# Patient Record
Sex: MALE | Race: BLACK OR AFRICAN AMERICAN | NOT HISPANIC OR LATINO | Employment: UNEMPLOYED
[De-identification: names, ages, dates, MRNs, and addresses within clinical notes are randomized per-mention and may not be internally consistent; named-entity substitution may affect disease eponyms.]

---
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
## 2021-02-12 ENCOUNTER — TRANSFERRED RECORDS (OUTPATIENT)
Dept: HEALTH INFORMATION MANAGEMENT | Facility: CLINIC | Age: 21
End: 2021-02-12

## 2021-02-15 ENCOUNTER — TRANSCRIBE ORDERS (OUTPATIENT)
Dept: OTHER | Age: 21
End: 2021-02-15

## 2021-02-15 DIAGNOSIS — M17.0 ARTHRITIS OF BOTH KNEES: Primary | ICD-10-CM

## 2021-09-01 ENCOUNTER — TRANSFERRED RECORDS (OUTPATIENT)
Dept: HEALTH INFORMATION MANAGEMENT | Facility: CLINIC | Age: 21
End: 2021-09-01

## 2021-09-01 ENCOUNTER — TELEPHONE (OUTPATIENT)
Dept: HEMATOLOGY | Facility: CLINIC | Age: 21
End: 2021-09-01

## 2021-09-01 NOTE — TELEPHONE ENCOUNTER
RN received a phone call from Roxie VILLAR at [facility] regarding scheduling a transition visit for Sharron.He was on the phone as well. Sharron is a 20 year old male with Severe Hemophilia A who is currently maintained on Hemlibra therapy- he is logging in [employer].     RN reviewed with Sivakumar Stephen PA-C and Sharron is now scheduled with Sivakumar on 11/9 at 9:00am for a 1 hour new. This will be scheduled and the team will be alerted.     Sheela Gonzalez, MSN, RN, PHN - Nurse Clinician - [facility] - [phone]

## 2021-11-09 ENCOUNTER — OFFICE VISIT (OUTPATIENT)
Dept: HEMATOLOGY | Facility: CLINIC | Age: 21
End: 2021-11-09
Attending: PHYSICIAN ASSISTANT
Payer: COMMERCIAL

## 2021-11-09 VITALS
BODY MASS INDEX: 27.16 KG/M2 | HEART RATE: 113 BPM | HEIGHT: 77 IN | DIASTOLIC BLOOD PRESSURE: 90 MMHG | WEIGHT: 230 LBS | RESPIRATION RATE: 14 BRPM | OXYGEN SATURATION: 98 % | SYSTOLIC BLOOD PRESSURE: 139 MMHG | TEMPERATURE: 98.1 F

## 2021-11-09 DIAGNOSIS — D66 HEMOPHILIC ARTHROPATHY (H): ICD-10-CM

## 2021-11-09 DIAGNOSIS — D66 HEMOPHILIA (H): ICD-10-CM

## 2021-11-09 DIAGNOSIS — M36.2 HEMOPHILIC ARTHROPATHY (H): ICD-10-CM

## 2021-11-09 DIAGNOSIS — D66: Primary | ICD-10-CM

## 2021-11-09 PROCEDURE — G0463 HOSPITAL OUTPT CLINIC VISIT: HCPCS

## 2021-11-09 PROCEDURE — 99205 OFFICE O/P NEW HI 60 MIN: CPT | Performed by: PHYSICIAN ASSISTANT

## 2021-11-09 RX ORDER — CHOLECALCIFEROL (VITAMIN D3) 50 MCG
1 TABLET ORAL DAILY
Qty: 90 TABLET | Refills: 3 | Status: SHIPPED | OUTPATIENT
Start: 2021-11-09

## 2021-11-09 RX ORDER — ANTIHEMOPHILIC FACTOR (RECOMBINANT) 4000 (+/-)
4000 KIT INTRAVENOUS ONCE
Qty: 4000 EACH | Refills: 0 | Status: SHIPPED | OUTPATIENT
Start: 2021-11-09 | End: 2021-11-09

## 2021-11-09 RX ORDER — SWAB
1 SWAB, NON-MEDICATED MISCELLANEOUS DAILY
Qty: 90 CAPSULE | Refills: 3 | Status: SHIPPED | OUTPATIENT
Start: 2021-11-09 | End: 2021-11-09

## 2021-11-09 RX ORDER — EMICIZUMAB 150 MG/ML
INJECTION, SOLUTION SUBCUTANEOUS
Qty: 4 ML | Refills: 5 | Status: SHIPPED | OUTPATIENT
Start: 2021-11-09 | End: 2022-03-08

## 2021-11-09 ASSESSMENT — MIFFLIN-ST. JEOR: SCORE: 2165.65

## 2021-11-09 ASSESSMENT — PAIN SCALES - GENERAL: PAINLEVEL: NO PAIN (0)

## 2021-11-09 NOTE — PROGRESS NOTES
[facility]  [address] [place] [id number]  Main: [phone], Fax: [phone]    Patient seen at: [facility] at [address] [place] [address]    Outpatient Visit Note:    Patient: Sharron Kennedy  MRN: [id number]  : 2000  VAMSHI: 2021    Reason:  Severe hemophilia A. Establish care for the first time at this adult treatment center.     HPI:  Sharron is a 21 year old male with a history of severe hemophilia A with his baseline factor VIII level at <1% who has no history of factor VIII inhibitor, presents to clinic today to establish care for the first time at this adult hemophilia treatment center ([facility]). He has been followed by [facility] prior to today's visit. His last hemophilia comprehensive clinic visit at [facility] was on 2021.     Sharron was born in [facility]. He was diagnosed with hemophilia at around 1 year of age. While in [place], he recalls that with any bleeding episodes, he had received FFP transfusion and ultimately he also might have received factor VIII concentrates. His family then moved to the [place] and resided in [place] back in 2010 when he was around 9 years of age. While he was residing in [place], he was placed on on-demand episodic factor VIII replacement therapy.     Then in , he moved to [facility] and was started on prophylaxis factor VIII replacement therapy with Advate at 15-20 Units/kg every Monday, Wednesday and .     In 2/15/2012, he had a port placed and then removed later around  when this port became infected. While in [place], he apparently had documented bleeding to the right knee on 2012. He also had documented right elbow hemarthrosis in 2012, 2012, 10/21/2012, 1/15/2013, 2013; 2013; 2014; and 2014.     Then in Summer of 2015, he  relocated from [facility] to [place].     11/10/2016, he underwent circumcision surgery with factor VIII replacement and did not experience any bleeding complications.     Oct 2018, his prophylactic factor VIII infusions was increased to every other day regimen due to increasing / persistent pain despite physical therapy.     The decision was made on 9/27/2019 to start him on emicizumab.     Since he has been on emicizumab, he reports no breakthrough bleeding episodes. He is very content and pleased with his experience with emicizumab. Currently he is on 600 mg SubQ Q 28 days injections (which is approximately 6 mg/kg Q 28 days).     As mentioned, his last comprehensive clinic visit at [facility] was back in Sept 2021 and at that visit, it was reported that he had no hospital or emergency department visit in the past year. He also reported zero numbers of joint bleeds in the past year.     It is reportedly that historically his bilateral elbows, knees and ankles have evidence of chronic arthropathy. 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 More recently in the past few months, he also has been wearing a knee brace on his right knee as he also felt that he also has patella subluxation on his right.     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     ROS:  Today, he has no particular complaints. He denies any bleeding issues. Denies any frequent epistaxis. No oral mucosal bleeding. Denies any hematuria or blood in stools.     Medication:  Current Outpatient Medications   Medication     ADVATE 4000 units SOLR injection     HEMLIBRA 105 MG/0.7ML injection     vitamin D3 (CHOLECALCIFEROL) 50 mcg (2000 units) tablet     No current facility-administered medications for this visit.     Allergies:   No Known Allergies    PmHx:  As above.     Social  "History:  He is currently attending college at [employer] studying a degree in computer aided design. He is anticipating to graduate in 2025. He does enjoy playing video games. 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 He graduated from [employer] in 2019. He lives with his parents and siblings currently. He does not eat pork.     Family History:  He has 3 sisters and 2 brothers. His two brothers (9 years of age and 12 years of age), both have hemophilia. He is unclear if his sisters are carrier of hemophilia.     Objective:  Pleasant in no acute distress.  Vitals: BP (!) 139/90 (BP Location: Right arm, Patient Position: Sitting, Cuff Size: Adult Regular)   Pulse 113   Temp 98.1  F (36.7  C) (Oral)   Resp 14   Ht 1.956 m (6' 5\")   Wt 104.3 kg (230 lb)   SpO2 98%   BMI 27.27 kg/m    Exam:    He uses knee braces in both knees.    He has limited extension of both elbows with right worse than left.     Ankle joints has full active ROM without deformities on today's gross examination.     Labs:  [facility] data done in 2016 according to records from [facility]:  Baseline factor VIII <1%  Ristocetin 82%  VW Agn 111%    Assessment:  In summary, Sharron is a 21 year old male with a history of severe hemophilia A with his baseline factor VIII level at <1% with no history of factor VIII inhibitor, presents to clinic today to transition his hemophilia care from pediatric [facility] to this adult [facility]. His last hemophilia comprehensive clinic visit was back in Sept 2021.     Sharron has been doing very well since he has transitioned to emicizumab back in Sept 2019. He has had no report of bleeding episodes since that time. In fact a lot of his pain related to his known arthropathy had significantly improved to basically resolved since he has been on emicizumab.     Diagnosis:  1. Severe hemophilia A.  2. Hemophilic arthropathy of multiple joints. "     Plan:  No change in regard to the management of his hemophilia. He will continue with emicizumab at 600 mg SubQ Q 28 days injection for prevention of acute bleeding episodes. I took some time today to discuss with him about the management of acute traumatic injuries or breakthrough bleeding while he is on emicizumab. I explain to him that despite the fact that he is on emicizumab, he could potentially have breakthrough bleeding with traumatic injuries and in those cases, we would recommend that he treat with factor VIII replacement. Thus, I have elected today to prescribe a single dose of Advate at 40 Units/kg dose for emergency use.     I have also provided him with a prescription of vitamin D3 today as he actually had run out of this prescription.     I have instructed Sharron that from today forward, he is to contact our clinic if he should experience any acute bleeding episodes or breakthrough bleeding or if he should require any surgical or invasive procedures. He is provided with our clinic contact information.     I have instructed him to return in Feb 2022 or March 2022 for a full hemophilia comprehensive clinic visit and to meet the rest of our team at that point. He is in agreement with this.     69 minutes spent on the date of the encounter doing chart review, history and exam, documentation and further activities per the note.    Time IN: 09:45  Time OUT: 10:38         Sivakumar Stephen PA-C, MPAS  Physician Assistant  [facility].

## 2021-11-09 NOTE — PATIENT INSTRUCTIONS
Thank you for coming into clinic today. We would like to see you back on March 8th at 10:30 am for a comprehensive care visit. We will call you the week before to remind you.     Please call us with any questions, comments or concerns    Nurse line [phone]   Sheela line [phone]    Sheela Gonzalez  MSN, RN, PHN  [facility] for [facility]  Office: [phone]  Fax: [phone]

## 2021-11-10 ENCOUNTER — TELEPHONE (OUTPATIENT)
Dept: HEMATOLOGY | Facility: CLINIC | Age: 21
End: 2021-11-10
Payer: COMMERCIAL

## 2021-11-15 ENCOUNTER — DOCUMENTATION ONLY (OUTPATIENT)
Dept: PHYSICAL THERAPY | Facility: CLINIC | Age: 21
End: 2021-11-15
Payer: COMMERCIAL

## 2021-11-15 NOTE — PROGRESS NOTES
This patient is new to our hemophilia clinic, he was seen by provider for initial visit on 11/9/2021.    Back in Sept 2021 this writer was contacted by [facility] for assistance with obtaining new knee bracing for this patient, they wanted to go through our system since he would be transitioning to adult care.  Orders were sent to [facility] in [place] ([phone], fax: [phone]) to evaluate and provide appropriate bracing for patellar subluxation.    Confirmed that patient received bilateral knee braces on 9/14/2021.    Ekta Ku [facility]  Physical Therapist  [facility]  [phone]

## 2022-02-08 ENCOUNTER — APPOINTMENT (OUTPATIENT)
Dept: INTERPRETER SERVICES | Facility: CLINIC | Age: 22
End: 2022-02-08
Payer: COMMERCIAL

## 2022-03-01 ENCOUNTER — CARE COORDINATION (OUTPATIENT)
Dept: HEMATOLOGY | Facility: CLINIC | Age: 22
End: 2022-03-01
Payer: COMMERCIAL

## 2022-03-01 DIAGNOSIS — D66 HEMOPHILIA (H): Primary | ICD-10-CM

## 2022-03-01 NOTE — PROGRESS NOTES
[facility] - COMPREHENSIVE CLINIC  PRE-VISIT CARE COORDINATION NOTE     NAME: Sharron Kennedy  :   2000  21 year old    Comp Clinic appointment date/time:  2022 at 10am  Confirmation of appointment:  LICSW called patient via language line and a voicemail was left with appointment date and time, and request for a call back to confirm.  Follow-up text was also sent a few days later without a response.    Goals for visit:     Last CC or Office Visit:  Office Visit with PA on 21, transitioned care from [facility]"" care.    Diagnosis:  Severe Hemophilia A  Factor level:  Factor VIII <1%  Inhibitor Hx:  No history of inhibitor  Viral issues:  Vaccinated in  for HAV, HBV, HIV and HCV status unknown  Other health issues:   none  Planned procedures or surgeries: unknown  Hemophilia treatment plan:  Hemlibra (Emicizumab) 600 mg every 28 days (started 2019)  Method of logging infusions:    unknown     Insurance:  [employer]   Pharmacy:  [facility]  Pharmacy Notes:  Not signed up for [facility], pharmacy needs to see him to help sign him up.  MPR:  We started filling in Nov, fill dates are 2021,2021,22,2/15/22  Pharmacokinetics: NA on hemlibra    Joint issues:  Reported to have hemarthropathy in all 6 index joints per [facility].  Has bilat knee patellar subluxation braces.    Orthopedic procedures:  No known procedures.    PT orders:  Orders placed 22 LB    Eligible studies:  Initial registry (labs required) and ATHNdataset

## 2022-03-07 ENCOUNTER — APPOINTMENT (OUTPATIENT)
Dept: CT IMAGING | Facility: CLINIC | Age: 22
End: 2022-03-07
Attending: EMERGENCY MEDICINE
Payer: COMMERCIAL

## 2022-03-07 ENCOUNTER — HOSPITAL ENCOUNTER (EMERGENCY)
Facility: CLINIC | Age: 22
Discharge: HOME | End: 2022-03-07
Attending: EMERGENCY MEDICINE | Admitting: EMERGENCY MEDICINE
Payer: COMMERCIAL

## 2022-03-07 VITALS
DIASTOLIC BLOOD PRESSURE: 77 MMHG | WEIGHT: 221.8 LBS | OXYGEN SATURATION: 96 % | TEMPERATURE: 98.4 F | HEIGHT: 77 IN | BODY MASS INDEX: 26.19 KG/M2 | RESPIRATION RATE: 16 BRPM | HEART RATE: 76 BPM | SYSTOLIC BLOOD PRESSURE: 121 MMHG

## 2022-03-07 DIAGNOSIS — G51.0 BELL'S PALSY: ICD-10-CM

## 2022-03-07 PROCEDURE — 70450 CT HEAD/BRAIN W/O DYE: CPT

## 2022-03-07 PROCEDURE — 99284 EMERGENCY DEPT VISIT MOD MDM: CPT | Mod: 25 | Performed by: EMERGENCY MEDICINE

## 2022-03-07 PROCEDURE — 99284 EMERGENCY DEPT VISIT MOD MDM: CPT | Performed by: EMERGENCY MEDICINE

## 2022-03-07 RX ORDER — PREDNISONE 20 MG/1
TABLET ORAL
Qty: 21 TABLET | Refills: 0 | Status: SHIPPED | OUTPATIENT
Start: 2022-03-07 | End: 2023-07-18

## 2022-03-07 RX ORDER — VALACYCLOVIR HYDROCHLORIDE 1 G/1
1000 TABLET, FILM COATED ORAL 3 TIMES DAILY
Qty: 21 TABLET | Refills: 0 | Status: SHIPPED | OUTPATIENT
Start: 2022-03-07 | End: 2023-07-18

## 2022-03-07 RX ORDER — MINERAL OIL, PETROLATUM 425; 568 MG/G; MG/G
1 OINTMENT OPHTHALMIC AT BEDTIME
Qty: 7 G | Refills: 0 | Status: SHIPPED | OUTPATIENT
Start: 2022-03-07

## 2022-03-07 ASSESSMENT — ENCOUNTER SYMPTOMS
FACIAL ASYMMETRY: 1
NUMBNESS: 1

## 2022-03-07 NOTE — ED TRIAGE NOTES
Pt states he has hemophila and use totake Factor 8.  They told him he can not take Fiaba medication.  Not able to place into allergies.

## 2022-03-07 NOTE — PROGRESS NOTES
[facility]  [facility]  [facility] [address], [place] [address]  Main: [phone], Fax: [phone]      COMPREHENSIVE HEMOPHILIA CLINIC VISIT      Patient: Sharron Kennedy  MRN: [id number]  : 2000  VAMSHI: 2022    Pertinent Hemophilia History:  Diagnosis: Severe hemophilia A, baseline factor VIII level <1%  Inhibitor: No history of inhibitor  First bleed, first factor exposure: Diagnosed at age 1, living in [place] at that time. Received FFP transfusion and possibly factor VIII concentrates as a child.   Past products: He moved to [place] in 2010 and was placed on on-demand factor VIII replacement therapy. In , he was placed on prophylaxis with Advate 15-20U/kg every M, W and F while living in [place]. He moved to [place] in  and established care at [facility]. His Advate dose frequency was changed in  to every other day due to worsening pain. Transitioned to Hemlibra in 2019.    Current Treatment Regimen:     Prophylaxis (Y/N): Yes   Type of prophylaxis  (Continuous or intermittent based on bleeding events): Continuous   Factor, dose, schedule: Hemlibra 600mg subcutaneous every 28 days.      On Demand for bleeds:    Factor, dose: Advate 4000U PRN for acute bleeding.       Other factor products used historically: FFP, possibly other F8 concentrate in childhood.     Home therapy? (Y/N): Yes       Central Venous Access Device (port etc , Y/N): Not currently.   He had a L chest port-o-cath placed 4841-1724, removed due to infection.     Previous significant bleeding events: ICH, circumcision, joint/muscle disease, procedures, and other events:   History of bilateral elbow, knee and ankle arthropathy.   Right knee bleed 2012   Port 9920-1242 d/c'd after infection.  Right elbow 2012 x 3, 2013 x 3, 2014 x 2   Circumcision w/ factor replacement in 2016, uncomplicated  Treated with Advate M/W/F however had  breakthrough bleeds and transitioned to every other day in 2018.  Transitioned from Advate to Hemlibra on 9/27/2019.  No history of ICH   No history of joint procedures     Infectious complications   Hepatitis A vaccines: Immune, 2010   HIV Status: Unknown    HCV Status and treatment: Unknown   HBV Status/vaccinations: Immune 2010     Interval History:  Today, Shayne reports that he is doing well. He has been very pleased with his transition to Hemlibra. He has not had any joint bleeds in the last year. He thinks it has been three years since his last bleed. He does have a dose of Advate at home that is unexpired. He reports that he is still has proficient infusion skills should he have an acute bleed. He has brothers that self infuse at home.    He does have chronic hemophilic arthropathy of bilateral elbows, knees and ankles. He has never had any joint related procedures previously per his report. He notes that since being on Hemlibra his bilateral elbow range of motion has improved although he still has some contracture. He denies any ongoing joint pain thus does not take any pain medications. 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     Lea is in school at [employer] and is doing his classes online currently. He lives between [facility] with his family. He has three brothers and three sisters. Two of his brothers also have Hemophilia A (on prophy). 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 He is mostly sedentary at the moment.     He does not have a primary care provider that he is established with at the moment. His last dental cleaning was over a year ago. He does not have any other chronic medical conditions. His only other medication is vitamin D supplement.     The patient reports he woke up two days ago and had facial hemiparesis. He was seen in the ER yesterday. CT head was  negative for any acute pathology. He was diagnosed with Bell's Palsy and given prescription for valcyclovir and prednisone. He notes some improvement in pain but is still having difficulty closing his eye completely and issues with spilling when trying to drink liquids. He denies any recent infection. No history of HSV/HIV. He reports some recent stressors. Sleep is adequate. He has never had Bell's Palsy previously. His cousin did have it 2 years ago related to medication side effect so was familiar with it.       1. Total # bleeding events since last comprehensive hemophilia clinic visit requiring factor infusion: 0    2. Method of recalling bleeding events (memory, paper log, yesenia etc): [employer]    3.  Number of Hemarthroses in the past year:  Location Number of bleeds Other information related to each bleed   Ankle, Left 0    Ankle, Right 0    Elbow, Left 0    Elbow, Right 0    Hip, Left 0    Hip, Right 0    Knee, Left 0    Knee, Right 0    Shoulder, Left 0    Shoulder, Right 0    Other joints:         4. Other Bleeds (ICH, GI,, Soft tissue etc.) and spontaneous vs traumatic: None    5. Joint Disease and Pain:    Overall activity level:   Activity level for: Unrestricted / Limited / Unable   Work/School Unrestricted   Recreation Limited- fairly sedentary   Self-Care Unrestricted     Chronic pain? (No or every day / most days / some days): No  Prescribed opioids for chronic pain and taking how often: (Y/N), (every day / most days / some days) No    6.  Hospitalizations/Surgeries   # ER visits since last comprehensive visit: 1 - 2/2 [facility]   Hospitalizations since last comprehensive visits and number of days admitted (not surgery): None  Surgeries since last comprehensive visits and number of days admitted: None     7.  Routine health maintenance:  - Does not have a primary care provider   - Last dental cleaning >1 year ago     8. Study participation:  Registry today     Medications:  Current Outpatient  "Medications   Medication    HEMLIBRA 105 MG/0.7ML injection    vitamin D3 (CHOLECALCIFEROL) 50 mcg (2000 units) tablet     No current facility-administered medications for this visit.       Allergies:  No Known Allergies    PmHx:  Hemarthropathy of all 6 index joints     Family History:  Number of family members with same diagnosis: 2 brothers with hemophilia A, on prophy factor 8    Social History:  Please see Rosa M NAJERA's note for her evaluation and assessment.   Highest level of education: Currently a student at [employer] studying [employer]. Planning to do business minor.  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      ROS:  13 point ROS completed and negative, except as noted above.   + facial hemiparesis R sided     Objective:  Vitals:  /79 (BP Location: Right arm, Patient Position: Sitting, Cuff Size: Adult Regular)   Pulse 82   Temp 97.8  F (36.6  C) (Oral)   Resp 16   Ht 1.956 m (6' 5\")   Wt 100.2 kg (221 lb)   SpO2 98%   BMI 26.21 kg/m     Exam:  General: No acute distress  Constitutional: Appears well, no distress  HEENT: Pupils equal and round. No scleral icterus or hemorrhage. Mucous membranes moist with no wet purpura. Dentition overall ok with no signs of decay. No LAD  CV: regular rate and rhythm, no murmurs  Respiratory: clear  GI: abdomen soft, nontender, without guarding or rebound. No hepatomegaly. No splenomegaly.   Mus/Skele: no edema. Contracture of bilateral elbows Please see Petra DAI's note for joint/gait evaluation.   Skin: no visible petechiae, no ecchymosis. Keloid scar L chest at prior port site   Neuro: CN II-XII intact.. AOx3    Labs:  No labs available for review.    Imaging:  CT head 3/7/22   FINDINGS: The ventricles and basal cisterns are within normal limits  in configuration. There is no midline shift. There are no " extra-axial  fluid collections.  Gray-white differentiation is well maintained.     No intracranial hemorrhage, mass or recent infarct.     The visualized paranasal sinuses are well-aerated. There is no  mastoiditis. There are no fractures of the visualized bones.                                                                      IMPRESSION:  Normal head CT.    2019 US L KNEE         10/2015 L ELBOW XR    10/2015 L Knee XR           Study Participation:  Registry    Assessment:  In summary, Sharron Kennedy is a 21 year old year old man with severe hemophilia A with a baseline factor VIII level of <1% without a history of inhibitor, on Emicizumab, who presents for transitional comprehensive hemophilia clinic visit. He has done very well since transitioning from Advate to Emicizumab in 2019 without any breakthrough bleeding events. He has a dose of Advate available at home for acute bleed if needed. He has chronic hemarthropic joint disease of bilateral ankles, knees, and elbows. These are stable. He has not needed any joint interventions.     Plan:    Factor replacement plan:  Emicizumab 600mg subcutaneously every 28 days.   Dose recalculated and unchanged today.   Advate 4000U +/- 10% IV as needed for acute bleeding episodes.   He has one dose at home that is not yet .     Hemophilic arthropathy management plan:  Home exercise plan   Continue to wear knee braces   If worsening joint pain, consider orthopedic referral   APAP PRN     Other medical follow up plan:  Establish care with primary care provider   Overdue for dental cleaning     Labs needed:  Registry labs obtained today     Follow up clinic visit timing:  3 month follow up clinic visit with myself and MALAIKA Willson (patient prefers ).    Petra DAI; Rosa M Jaffe [facility]; and Sheela Gonzalez, nurse clinician have all seen the patient independently, please refer to their notes for their evaluation and assessment.  Dr. Pino Alvarez,  staff hematologist has also seen this patient today in clinic.       Aminata Jacobo, MPH, PAFRIEDA  [facility] for Bleeding and Clotting Disorders      HEMATOLOGY STAFF    Patient seen and evaluated as part of a shared visit.  I have reviewed the clinical history, physical exam, relevant labs, and treatment plan with the [facility], as well as other members of the comprehensive hemophilia care team.      Key findings:    21-year-old male with severe hemophilia A, no history of inhibitor.  This is his first visit to the adult [facility].  Has been on emicizumab since September 2019.  Currently on monthly maintenance dosing.  No breakthrough bleeds in the last year.  He has hemophilic arthropathy in the bilateral ankles, knees, and elbows as he did not receive prophylaxis during childhood.  Joint status currently stable.    Key management decisions:    No change in treatment plan at this time.  Return visit in 3 months for routine follow-up and to make sure he is solidly established here, as today was his first visit with us.      Total time 60 minutes, including review of medical records and labs, clinic visit, and documentation.      Pino Alvarez MD  Professor of Medicine  Division of Hematology, Oncology, and Transplantation  Director, [facility]    ___  Addendum 9/9/23  Patient has 2 maternal half brothers with hemophilia, one of whom has a history of factor VIII inhibitor.    Vidhya Licona PA-C, [facility]  [facility]  [address] [place] [address], [address], [place] [address]  Main: [phone], Fax: [phone]

## 2022-03-07 NOTE — ED PROVIDER NOTES
[facility] EMERGENCY DEPARTMENT ([facility])       3/07/22  History     Chief Complaint   Patient presents with     Neurologic Problem     Pt has right sided face numbness  that started yesterday.  Staes he is not having other symptoms related to stroke.     The history is provided by the patient and medical records.     Sharron Kennedy is a 21 year old male with a past medical history significant for factor 8 deficiency hemophilia who presents to the Emergency Department for evaluation of facial numbness. Patient reports right sided facial numbness along with intermittent right sided pain since yesterday. He states he was drinking water and the right side suddenly could not contain the water. Patient also notes dryness in the right eye. Patient is concerned for bells palsy since his cousin was recently diagnosed. He denies a weird taste in the mouth, rash, current numbness, injuries, or weakness in the extremities. Patient does notes a recent cut on his right ear. He notes he has a monthly infusion tomorrow for his hemophilia.      Past Medical History  Past Medical History:   Diagnosis Date     H/O hemophilia     Factor 8 deficiency     History reviewed. No pertinent surgical history.  Emicizumab-kxwh (HEMLIBRA SC)  predniSONE (DELTASONE) 20 MG tablet  valACYclovir (VALTREX) 1000 mg tablet  White Petrolatum-Mineral Oil (REFRESH LACRI-LUBE) OINT      Not on File  Family History  No family history on file.  Social History   Social History     Tobacco Use     Smoking status: Never Smoker     Smokeless tobacco: Never Used   Substance Use Topics     Alcohol use: Never     Drug use: Never      Past medical history, past surgical history, medications, allergies, family history, and social history were reviewed with the patient. No additional pertinent items.     I have reviewed the Medications, Allergies, Past Medical and Surgical History, and Social History in the Epic system.    Review of Systems  "  Neurological: Positive for facial asymmetry and numbness.     A complete review of systems was performed with pertinent positives and negatives noted in the HPI, and all other systems negative.    Physical Exam   BP: 121/77  Pulse: 76  Temp: 98.4  F (36.9  C)  Resp: 16  Height: 195.6 cm (6' 5\")  Weight: 100.6 kg (221 lb 12.8 oz)  SpO2: 96 %      Physical Exam  Gen:A&Ox3, no acute distress  HEENT:PERRL, no facial tenderness or wounds, head atraumatic, oropharynx clear, mucous membranes moist, TMs clear bilaterally. Scab on helix of right ear without other vesicles or rash. No parotid mass, visible ecchymosis, or facial swelling  Neck:no bony tenderness or step offs, no JVD, trachea midline, no bruits  Back: no CVA tenderness, no midline bony tenderness  CV:RRR without murmurs  PULM:Clear to auscultation bilaterally  Abd:soft, nontender, nondistended. Bowel sounds present and normal  UE:No traumatic injuries, skin normal  LE:no traumatic injuries, skin normal  Neuro:CN II-XII testing shows right side weakness of forehead, eye closure, and droop of the lower face. Left face strength 5/5. Strength 5/5 of flexion and extension of the toes, ankles, knees, hips, hands, wrists, elbows and shoulders.  Sensation intact to touch throughout. Coordination normal on finger to nose testing. Reflexes 3/6 and symmetric throughout. No clonus.  Gait normal.  Skin: no rashes or ecchymoses    ED Course     At 3:29 PM the patient was seen and examined by Linsey Garcia MD in Room ED05.        Procedures           Results for orders placed or performed during the hospital encounter of 03/07/22 (from the past 24 hour(s))   Head CT w/o contrast    Narrative    CT OF THE HEAD WITHOUT CONTRAST 3/7/2022 4:37 PM     COMPARISON: None    HISTORY:  Cerebral hemorrhage suspected, altered mental status. New  onset right side face numbness. Hemophilia patient.    TECHNIQUE: Axial CT images of the head from the skull base to the  vertex were " acquired without IV contrast.    FINDINGS: The ventricles and basal cisterns are within normal limits  in configuration. There is no midline shift. There are no extra-axial  fluid collections.  Gray-white differentiation is well maintained.    No intracranial hemorrhage, mass or recent infarct.    The visualized paranasal sinuses are well-aerated. There is no  mastoiditis. There are no fractures of the visualized bones.      Impression    IMPRESSION:  Normal head CT.      Radiation dose for this scan was reduced using automated exposure  control, adjustment of the mA and/or kV according to patient size, or  iterative reconstruction technique    ERIS WILKINS MD         SYSTEM ID:  [id number]     Medications - No data to display          Assessments & Plan (with Medical Decision Making)   22 yo M with a hx of hemophilia presenting on day 2 of right sided Bell's Palsy.   Vitals stable.   Pt is without signs of central motor neuron lesions, and there has been no trauma to the head or face.   CT of the head done due to his risk for bleeding from hemophilia to assess for intracranial bleed or facial soft tissue hematoma. This was unremarkable.     Started on treatment for bell's palsy with eye care, prednisone and valacyclovir.   Follow up with primary care.   Discharged.     I have reviewed the nursing notes.    I have reviewed the findings, diagnosis, plan and need for follow up with the patient.    Discharge Medication List as of 3/7/2022  4:45 PM      START taking these medications    Details   predniSONE (DELTASONE) 20 MG tablet Take three tablets (= 60mg) each day for 7 (seven) days, Disp-21 tablet, R-0, E-Prescribe      valACYclovir (VALTREX) 1000 mg tablet Take 1 tablet (1,000 mg) by mouth 3 times daily for 7 days, Disp-21 tablet, R-0, E-Prescribe      White Petrolatum-Mineral Oil (REFRESH LACRI-LUBE) OINT Apply 1 Application to eye At Bedtime To the right eye, Disp-7 g, R-0, E-Prescribe             Final  diagnoses:   Bell's palsy     I, Luana Kelly, am serving as a trained medical scribe to document services personally performed by Linsey Garcia MD based on the provider's statements to me on March 7, 2022.  This document has been checked and approved by the attending provider.    I, Linsey Garcia MD, was physically present and have reviewed and verified the accuracy of this note documented by Luana Kelly medical scribe.      Linsey Garcia MD  3/7/2022   [facility] EMERGENCY DEPARTMENT     Linsey Garcia MD  03/07/22 2027

## 2022-03-07 NOTE — DISCHARGE INSTRUCTIONS
Thank you for coming to the [facility] Emergency Department.     Please follow up with your hematology clinic as scheduled. OK to continue your hemophilia therapy as usual.     To protect your eye, use over the counter artifical tears drops every hour as needed. At bedtime, apply eye lubricant and then tape the lid closed with a gentle paper tape. Continue this until the eye muscle strength returns. Please see your eye doctor with any concerns about your eye.

## 2022-03-08 ENCOUNTER — ALLIED HEALTH/NURSE VISIT (OUTPATIENT)
Dept: HEMATOLOGY | Facility: CLINIC | Age: 22
End: 2022-03-08
Payer: COMMERCIAL

## 2022-03-08 ENCOUNTER — OFFICE VISIT (OUTPATIENT)
Dept: HEMATOLOGY | Facility: CLINIC | Age: 22
End: 2022-03-08
Attending: INTERNAL MEDICINE
Payer: COMMERCIAL

## 2022-03-08 ENCOUNTER — HOSPITAL ENCOUNTER (OUTPATIENT)
Dept: PHYSICAL THERAPY | Facility: CLINIC | Age: 22
Setting detail: THERAPIES SERIES
End: 2022-03-08
Attending: PHYSICIAN ASSISTANT
Payer: COMMERCIAL

## 2022-03-08 VITALS
WEIGHT: 221 LBS | BODY MASS INDEX: 26.09 KG/M2 | HEART RATE: 82 BPM | DIASTOLIC BLOOD PRESSURE: 79 MMHG | TEMPERATURE: 97.8 F | RESPIRATION RATE: 16 BRPM | SYSTOLIC BLOOD PRESSURE: 128 MMHG | HEIGHT: 77 IN | OXYGEN SATURATION: 98 %

## 2022-03-08 DIAGNOSIS — Z71.9 ENCOUNTER FOR COUNSELING: Primary | ICD-10-CM

## 2022-03-08 DIAGNOSIS — D66 HEMOPHILIA (H): ICD-10-CM

## 2022-03-08 DIAGNOSIS — D66 HEMOPHILIA (H): Primary | ICD-10-CM

## 2022-03-08 PROCEDURE — 97161 PT EVAL LOW COMPLEX 20 MIN: CPT | Mod: GP | Performed by: PHYSICAL THERAPIST

## 2022-03-08 PROCEDURE — 99205 OFFICE O/P NEW HI 60 MIN: CPT | Performed by: INTERNAL MEDICINE

## 2022-03-08 PROCEDURE — G0463 HOSPITAL OUTPT CLINIC VISIT: HCPCS

## 2022-03-08 PROCEDURE — 97110 THERAPEUTIC EXERCISES: CPT | Mod: GP | Performed by: PHYSICAL THERAPIST

## 2022-03-08 RX ORDER — EMICIZUMAB 150 MG/ML
INJECTION, SOLUTION SUBCUTANEOUS
Qty: 4 ML | Refills: 5 | Status: SHIPPED | OUTPATIENT
Start: 2022-03-08 | End: 2022-03-10

## 2022-03-08 RX ORDER — ANTIHEMOPHILIC FACTOR (RECOMBINANT) 4000 (+/-)
4000 KIT INTRAVENOUS
Qty: 1 EACH | Refills: 1 | Status: CANCELLED | OUTPATIENT
Start: 2022-03-08

## 2022-03-08 RX ORDER — ANTIHEMOPHILIC FACTOR (RECOMBINANT) 4000 (+/-)
4000 KIT INTRAVENOUS
COMMUNITY
Start: 2022-03-08 | End: 2023-07-18

## 2022-03-08 ASSESSMENT — PAIN SCALES - GENERAL: PAINLEVEL: NO PAIN (0)

## 2022-03-08 NOTE — PROGRESS NOTES
[facility] Services    OUTPATIENT PHYSICAL THERAPY HEMOPHILIA CLINIC NOTE  [facility]      Sharron Kennedy  YOB: 2000  [id number]    Reason for visit: Comprehensive Clinic  Date of service:  3/8/2022  Referring provider: Aminata Jacobo PA-C  Medical Diagnosis: Factor VIII -  Severe  Replacement therapy: Prophylaxis: Schedule- Hemlibra q 28 days   present: No  Language: Belgian and English    Interval History (include personal factors and/or comorbidities that impact the plan of care):   Sharron is new to adult clinic today, was last seen on 9/1/2021 for a comp clinic at [facility], records reviewed.  Complete joint assessment completed at that visit.  Today he reports no new joint concerns and has had no MSK bleeds in the past year.    Bleeds: None in the past year.   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  He reports minimal physical lifting associated with his tasks.  He is also taking classes at [employer] in computer design, this is all online.  Exercise/physical activity: Minimal activity outside of daily tasks and PCA tasks, he states he sits at his computer a lot. He does do biceps curls with 30# weight occasionally, and heel raises occasionally.    Equipment: Patient has bilat knee subluxation braces, new braces received through [facility] in Sept 2021. .    Orthopedic past medical history:   None    Pain:  Chief complaint: Denied    Fall Risk Screen:  Has the patient fallen 2 or more times in the last year? No  Has the patient fallen and had an injury in the past year? No  Timed Up and Go Score: NA  Is the patient a fall risk? No    Physical Exam:   ROM-see measurements from 9/1/21 in table below  Swelling-None   Crepitus-None  Posture-Forward head and  shoulders with slumped sitting posture.  Able to correct with cues.  Decreased core stability.   Gait-Independent without assistive device.  Assessed with and without shoes.  Patient has right foot toeing in and overpronation, bilat flexion throughout gait cycle, wide ANDERS with increased lateral weight shifts.    Other-Bilat patella tamia  ROM L elbow R elbow L Knee R Knee L Ankle R Ankle Other: Other:    Flex (DF) 151 134 140 132 1 -2     Ext (PF) -25 -41 -10 -10 50 50     Pronation           Supination             Assessment: Sharron presents for transition to adult care visit.  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 Patella tamia well managed with bilateral knee braces.  Decreased muscle tone and joint stability throughout.  Decreased activity level for his age.  Muscle tightness in post leg muscles bilaterally.  Postural impairments.    Treatment diagnosis: Impaired posture / muscle imbalance, Joint hypomobility, Muscle flexibility limitations, Muscle strength and endurance limitations, ROM limitations, gait impairment    Clinical Presentation: Stable/Uncomplicated  Clinical Presentation Rationale: Stable presentation of chronic condition  Clinical Decision Making (Complexity): Low complexity  Treatment:  Sharron will benefit from skilled physical therapy to address identified impairments and for home management of bleeding disorder.  Treatment consisting of:   -therapeutic exercise to develop: strength, endurance, flexibility and core stability through HEP instruction.  Provided HEP of HS/calf stretches in sitting, standing double and single heel raises, wall sit with VMO activation with basketball.  Instructed in regular walking program and upright activity after stretching.  Encouraged continuation of biceps curls.    -Discussed the importance of supportive footwear, instructed to wear laced up shoes with adequate arch support and good  non-slip tread.   Plan of Care:   1. Patient to incorporate 3 new exercises into his daily routine.  Will add additional exercises as needed at next visit.    2. Plan to monitor foot positioning and posterior leg muscle length with regular exercise program, consider shoe insert trial at next visit to support pronation as needed.      Therapy Frequency and Predicted Duration of Therapy Intervention: One session evaluation and treatment  Goals: Patient verbalizes understanding of evaluation results, home management and home exercise recommendations provided today to maximize functional mobility and allow for continued safe participation in current home and work activities. -Goal Met    Recommendations: Follow up at next scheduled [facility] clinic visit  Educational assessment/Barriers to learning: No barriers noted  Treatment provided this date (including minutes): Therapeutic Procedure: 15   Patient/Family Education:Splints/orthoses/equipment, General information on benefits of exercise and physical activity, Footwear selection, Home environment safety/falls prevention and Home exercise program: Written and verbal instruction in the following: see above   Risks and benefits of evaluation/treatment have been explained.  Patient, family and/or caregiver are in agreement with Plan of Care.     Timed Code Treatment Minutes: 15  Total Treatment Time (sum of timed and untimed services): 40    Signature: Ekta Ku [facility]  Physical Therapist  [facility]  [phone]    Date: 3/8/2022    Certification:  Onset date: 3/8/2022  Start of care date: 3/8/2022  Certification date from 3/8/2022 to 3/8/2022    I CERTIFY THE NEED FOR THESE SERVICES FURNISHED UNDER        THIS PLAN OF TREATMENT AND WHILE UNDER MY CARE     (Physician co-signature of this document indicates review and certification of the therapy plan).

## 2022-03-08 NOTE — PROGRESS NOTES
.  [facility]  Social Work Evaluation    Name:  Sharron Kennedy  Age:   21 year old  :  2000    Presenting Information:  Sharron is followed by the [facility] for management of severe hemophilia A.  He presented to clinic today for a routine comprehensive clinic evaluation, his first adult comp clinic visit.  He established care at this clinic on 21 in transition from [facility].  Met with the patient 1:1 to introduce self and role, and complete psychosocial assessment.    Living Situation:   Sharron lives in a home in [place] with his family, including his parents and 6 younger siblings (3 sisters, 3 brothers - two of which are affected with hemophilia as well).    Sharron was born in [place]. His family arrived in [place] in  and after a few weeks, relocated to [place].  They lived there for 9 months before moving to [place], where the lived for 3 years.  In the summer of , when Sharron was 14, they moved to [place] where he started his freshman year of high school.  They initially lived in [place] and moved to [place] last summer.    Family/Social Support:    Family includes his parents and 6 younger siblings.  His dad had a stroke in 2019 but is improving every day.  He is working on some renovations at their new home.  He has an aunt that moved back to [facility]. He stated he has a small friend group that he primarily keeps in touch with electronically right now as he is so busy. He is not in a romantic relationship at this time  He described a stable and involved support network.    Functional Status:   Independent with ADLs and IADLs, including active motor vehicle .  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    Current Community Services:    [facility]    Past Community Services:   [facility]    Chemical Use:    Sharron denied  use of alcohol, tobacco or ilicit substances.    Mental/Emotional Health:   PHQ-2: 0.  Pt did not endorse symptoms related to anxiety or depression.    Pain Management Strategies: Does not report chronic pain.    Abuse Concerns:  No concerns indicated.    Education: Graduated from [employer] in 2019.  Currently enrolled at [employer] working on a degree in Computer [employer]ided [employer] with a minor in business.  He is not sure when he will graduate but he has been consistently been taking a few courses each semester either in hybrid or fully on-line format.    He stated he was unaware of hemophilia scholarship opportunities and these will be emailed to him today.    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    School/Work Attendance:   Sharron has missed zero days of work in the past year due to his bleeding disorder.    Financial/Income:  Payroll. No financial concerns were reported today.  In the past, he has received assistance with gas getting to/from appointments but today denied need for this today.    Health Insurance:  [employer] [place] - no concerns were reported related to coverage or out of pocket costs.    Health Literacy/Adjustment to Illness:  Today is Sharron's first adult comprehensive clinic visit.  He was engaged in his care and forthcoming with information.  He is independently ordering his own medication and scheduling his own appointments. Sharron is on an every 28 day hemlibra schedule and logs via [employer].    Medicalert:  Had a bracelet but it broke.  Will email him information to choose new jewelry for order via [facility].    Advanced Care Planning:  No HCD.    Authorization to discuss PHI:  Completed today to include his mom Remington Rocha.  Electronic Consent for email: Completed today.  Electronic Consent for text messaging:   Completed today.    Interventions Utilized:  Assessment of strengths and needs  Assessment of impact of living with a chronic health condition, overall adjustment, and current coping skills  Explored and processed emotional/social responses to bleeding disorder and treatment plan.  Normalized and validated feelings and experiences  Provided positive feedback and encouragement  Discussed Applicable Community Resources:  Medicalert  Discussed transition-related topics: pt independent with ordering medication, logging doses, and making appointments  Case Coordination with [facility] team    Impressions/Recommendations:    Sharron Kennedy is a 21 year old male who presented to [facility] at [facility] this date for a routine comprehensive clinic evaluation.  He presented today as alert, oriented and engaged and they were welcoming of social work visit.    Today is Sharron's first adult comprehensive clinic visit.  He was forthcoming with information.  He is independently ordering his own medication and scheduling his own appointments. Sharron is on an every 28 day hemlibra schedule and logs via [employer]  He has good coping skills and a stable and involved support network.    Plan:   Follow up information was sent to Sharron re: new medicalert options and bleeding disorder scholarships.  There were no ongoing psychosocial needs identified.  Writer remains available to assist as needs arise, and Sharron was encouraged to contact writer as needed.    Rosa M Jaffe, MSW, LICSW, ACM  Clinical   [facility]  [facility]  Phone: [phone]

## 2022-03-08 NOTE — PATIENT INSTRUCTIONS
"If on prophy treatment with factor, call with any breakthrough bleeding for possible dose adjustment.      Dose of Hemlibra is 600mg every 28days    For emergency head trauma, please treat with at least 50 units/kg to boost your factor level to 100%.  Please seek emergency care for head CT scan.    Continue to keep infusion records via [employer]    Patient Education & Resources:    If you would like further information about your bleeding disorder, the following links may be of help:    The National Hemophilia Foundation (includes HANDI educational resource link)   Www.hemophilia.org    The World Federation of Hemophilia (includes [facility] [place] Directory)   Www.wf.org    Carry factor concentrate with you at all times. Call the center if you will be traveling out of the area. We can create a travel letter prior to your departure . For treatment centers around the world go to www.wf.org, click on top right link \"RESOURCES\" and then scroll down to  \"Find a Treatment [place]\". Enter country, then scroll down to city closest to destination.    Wear medic alert on your person for highest level of safety www.medicalert.org    See dentist every 6 months. Call the center for recommendations for dental visits that involve more than an examination and cleaning. If you have had a joint replacement or port for infusions, please call for antibiotic recommendations for dental cleaning. See handout of dental provider options.     See primary care provider for general health maintenance.    For any questions, your nurse clinician is Sheela Gonzalez,  MSN, RN, [phone].  If Sheela is unavailable and you have immediate concerns, please call [phone] and ask for a nurse.     If you have emergency needs in the evening or weekend, please call [phone] and ask for the hematologist on call or Dr. Pino Alvarez.    Please return for comprehensive clinic in 1 year and to clinic for a follow up visit in 3-6 months.  "

## 2022-03-08 NOTE — PROGRESS NOTES
Sharron Kennedy  [id number]  2000    Teaching Flowsheet   Sharron Kennedy  has a diagnosis of hemophilia A with a baseline factor VIII level of  <1% . Sharron Kennedy  presents today for his comprehensive Hemophilia clinic evaluation.    Teaching Topic: Comprehensive care visit     Learning Assessment  Person(s) involved in teaching:   Patient     Motivation Level:  Asks Questions: Yes  Eager to Learn: Yes  Cooperative: Yes  Receptive (willing/able to accept information): Yes       Patient demonstrates understanding of the following:  Reason for the appointment, diagnosis and treatment plan: Yes  Knowledge of proper use of medications and conditions for which they are ordered (with special attention to potential side effects or drug interactions): Yes  Which situations necessitate calling provider and whom to contact: Yes    Pain management techniques: Yes  How and/when to access community resources: Yes     Nursing Summary  Sharron Kennedy uses Hemlibra and treats prophylactically every 28 days.  He treats himself using independent subcutaneous injections and his typical dose is 600 units/mg. He keeps track of his infusions by using [employer].    Patient's goal for today's visit is: establish care    Patient presents for his first comp clinic [facility]. He is happy with his Hemlibra and has 1 dose of factor at home.    Other Health Maintenance  Patient does not have a primary care provider and was encouraged to consider one  Patient last saw the dentist in 2019. No further follow up needed at this time, will give patient information on other dentists closer to his home.    Nursing Education Provided:  - Reviewed procedure for home infusions of factor concentrates.  See attached treatment recommendations.   - Reviewed severe/life threatening hemorrhage and handout given that recommends appropriate dosing.  If suspect bleeding in head, neck, throat or abdomen,  give dose of factor if able, contact the hemophilia center immediately or report to the Emergency Room at [facility] or the nearest emergency room.   - Discussed need for dental check-ups and prophylactic antibiotics for dental procedures.   - Reviewed resources/weblinks with patient.    The National Hemophilia Foundation (HANDI educational resource request)   Www.hemophilia.org    The World Federation of Hemophilia (Find a treatment center)   Www.wf.org  -Patient was given [facility] Contact Card.

## 2022-03-10 RX ORDER — EMICIZUMAB 150 MG/ML
INJECTION, SOLUTION SUBCUTANEOUS
Qty: 4 ML | Refills: 11 | Status: SHIPPED | OUTPATIENT
Start: 2022-03-10

## 2022-08-16 NOTE — PROGRESS NOTES
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 He was diagnosed at the age of 1 while living in [place]. He had received FFP and possibly factor VIII concentrates on demand as a child. He moved to [place] in  and was placed on demand factor VIII replacement therapy. In , he started on prophylaxis regimen with Advate 20 unit(s) three days a week. In , he moved to [place] and established care at [facility]. His dose frequency was changed to every other day in  due to worsening pain and breakthrough bleeding. In 2019, he was transitioned to Hemlibra. He is currently on once monthly dosing regimen. He has documented hemophilic arthropathy of bilateral elbows, knees and ankles. He has no history of prior orthopedic procedures.     His last comp clinic visit was on 3/8/2022. He noted at that visit, he had not had any breakthrough bleeding episodes in three years. He does have patellar tamia and history of subluxation thus uses bilateral knee braces daily. He did not endorse any chronic pain.  He met with Petra Ku, physical therapist, who recommended home exercise program and supportive footwear and reassessment in three months.       Interim History:  Today, Sharron Kennedy notes that he is doing well. He is currently working two jobs. 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 He tells me that he has been doing well on his Hemlibra medication. He is logging well and denies any breakthrough bleeding episodes. He has an emergency dose of Advate at home if needed. He notes his joints feel great. He has been doing his home exercise and stretching program as prescribed by our physical therapist at his last visit. He denies any pain of the joints and he has not needed APAP. He has not been wearing his knee braces on a routine basis since as his knees have felt better on the exercise program. He has had some intermittent subluxation of bilateral patella. He does wear supportive shoes but does not use shoe inserts. Denies any ankle or knee pain after standing for his 4 hour shifts at [employer]. No issues or site reactions with his Hemlibra injections.     ROS:  Denies any bleeding complications. Specifically, no frequent epistaxis. No issues with oral mucosal bleeding. Denies any hematuria or blood in stools. Denies any shortness of breath. No chest pain. No cough. No fever.      Medications:   Current Outpatient Medications   Medication Sig Dispense Refill     Emicizumab-kxwh (HEMLIBRA SC) Inject 600 mg Subcutaneous every 30 days       factor VIII rAHF-PFM (ADVATE) 4000 units SOLR injection Inject 4,000 Units into the vein once as needed (for acute hemophilia bleeding episode)       HEMLIBRA 150 MG/ML injection Inject Hemlibra at 600mg subcutaneous every 28 days for prevention of bleeds 4 mL 11     predniSONE (DELTASONE) 20 MG tablet Take three tablets (= 60mg) each day for 7 (seven) days (Patient not taking: Reported on 8/18/2022) 21 tablet 0     valACYclovir (VALTREX) 1000 mg tablet Take 1 tablet (1,000 mg) by mouth 3 times daily for 7 days 21 tablet 0     vitamin D3 (CHOLECALCIFEROL) 50 mcg (2000 units) tablet Take 1 tablet (50 mcg) by mouth daily (Patient not taking: Reported on 8/18/2022) 90 tablet 3     White  "Petrolatum-Mineral Oil (REFRESH LACRI-LUBE) OINT Apply 1 Application to eye At Bedtime To the right eye (Patient not taking: Reported on 8/18/2022) 7 g 0        Allergies:    No Known Allergies    PMH:  Past Medical History:   Diagnosis Date     H/O hemophilia     Factor 8 deficiency     Hemophilic arthropathy     all 6 index joints        Social History:   Social History     Tobacco Use     Smoking status: Never Smoker     Smokeless tobacco: Never Used   Substance Use Topics     Alcohol use: Never     Drug use: Never       Family History:  Deferred.    Objective:  Vitals: /76 (BP Location: Right arm, Patient Position: Sitting, Cuff Size: Adult Regular)   Pulse 72   Temp 98  F (36.7  C) (Oral)   Resp 12   Ht 1.956 m (6' 5\")   Wt 96.2 kg (212 lb 1.6 oz)   SpO2 99%   BMI 25.15 kg/m       Exam:   Constitutional: Appears well, no distress  HEENT: Pupils equal and round. No scleral icterus or hemorrhage.   Respiratory: no increased work of breathing.   Mus/Skele: no edema of lower extremities, decreased extension of bilateral knees and elbows. Crepitus of all 6 joints (ankles, elbows, knees). No edema appreciated on examination. Bony overgrowth appreciated of bilateral knees and subluxation of right patella on examination today.   Skin: no petechiae, no ecchymosis on exposed dermis.  Neuro: CN II-XII intact. Normal gait. AOx3      Labs:  CBC RESULTS: No results for input(s): WBC, RBC, HGB, HCT, MCV, MCH, MCHC, RDW, PLT in the last 95245 hours.  Last Comprehensive Metabolic Panel:  No results found for: NA, POTASSIUM, CHLORIDE, CO2, ANIONGAP, GLC, BUN, CR, GFRESTIMATED, IMTIAZ  Liver Function Studies - No results for input(s): PROTTOTAL, ALBUMIN, BILITOTAL, ALKPHOS, AST, ALT, BILIDIRECT in the last 82531 hours.     Imaging:  No results found for this or any previous visit (from the past 744 hour(s)).     Assessment:  1. Severe Hemophilia A, without history of inhibitor currently on monthly Hemlibra without " recent breakthrough bleeding episodes.   2. Hemophilic arthropathy of bilateral elbows, knees and ankles.   3. Patella tamia bilaterally, has knee braces    Plan:    Continue Emicizumab 600mg subcutaneously every 28 days    Home exercise and stretching plan to be continued. Will have a PT only visit in the next 4-6 weeks to add to his program and be fitted for shoe inserts.     Continue to wear knee braces daily.     Establish care with primary care provider.       Patient instructed to contact the clinic should they require any surgical procedures for perioperative planning.     Return to clinic for annual comp visit in 3/2023, sooner if any concerns.       Aminata Jacobo, MPH, PA-C  [facility]    15 minutes spent on the date of the encounter doing chart review, patient visit and documentation

## 2022-08-18 ENCOUNTER — OFFICE VISIT (OUTPATIENT)
Dept: HEMATOLOGY | Facility: CLINIC | Age: 22
End: 2022-08-18
Attending: PHYSICIAN ASSISTANT
Payer: COMMERCIAL

## 2022-08-18 VITALS
SYSTOLIC BLOOD PRESSURE: 120 MMHG | BODY MASS INDEX: 25.04 KG/M2 | HEART RATE: 72 BPM | OXYGEN SATURATION: 99 % | WEIGHT: 212.1 LBS | TEMPERATURE: 98 F | RESPIRATION RATE: 12 BRPM | DIASTOLIC BLOOD PRESSURE: 76 MMHG | HEIGHT: 77 IN

## 2022-08-18 DIAGNOSIS — D66 HEMOPHILIC ARTHROPATHY (H): ICD-10-CM

## 2022-08-18 DIAGNOSIS — D66: Primary | ICD-10-CM

## 2022-08-18 DIAGNOSIS — M36.2 HEMOPHILIC ARTHROPATHY (H): ICD-10-CM

## 2022-08-18 PROCEDURE — G0463 HOSPITAL OUTPT CLINIC VISIT: HCPCS

## 2022-08-18 PROCEDURE — 99213 OFFICE O/P EST LOW 20 MIN: CPT | Performed by: PHYSICIAN ASSISTANT

## 2022-08-18 ASSESSMENT — PAIN SCALES - GENERAL: PAINLEVEL: NO PAIN (0)

## 2022-08-18 NOTE — PATIENT INSTRUCTIONS
[facility]  [facility]  [facility] [address], [address], [place] [address]  Main: [phone], Fax: [phone]    Sharron,   It was a pleasure seeing you today.  Thank you for allowing us to be involved in your care.  Please let us know if there is anything else we can do for you, so that we can be sure you are leaving completely satisfied with your care experience. Below is the plan that we discussed.     Continue Hemlibra   Continue to do your home exercise and stretching program and we will set you up to see physical therapy back to add more exercises in the next 4-6 weeks. Wear your knee braces daily. Wear good supportive shoes while at work.   Establish care with a primary care provider.     Return to clinic in  3/2023 for comprehensive clinic visit. Sooner if any concerns.     Your nurse clinician is Yaima Duran, MSN, RN, [phone].   If they are unavailable and you have immediate concerns, please call [phone] and ask for a nurse.     Aminata Jacobo, MPH, PA-C  [facility]

## 2022-10-24 ENCOUNTER — TELEPHONE (OUTPATIENT)
Dept: HEMATOLOGY | Facility: CLINIC | Age: 22
End: 2022-10-24

## 2022-10-24 NOTE — TELEPHONE ENCOUNTER
Left voicemail for patient about his request to schedule a PT appointment. Offered 10/25/2022 as an open day for the appt. Gave [phone] as call back number.

## 2022-11-04 DIAGNOSIS — D66 HEMOPHILIA (H): ICD-10-CM

## 2022-11-07 NOTE — PROGRESS NOTES
"[facility]  OUTPATIENT PHYSICAL THERAPY HEMOPHILIA CLINIC NOTE    Sharron Kennedy   YOB: 2000  MRN# [id number]   Age: 22 year old  Date of service:  11/8/22     present: No, Language: English & Omani  Referring provider: Aminata Manrique PA-C   Reason for visit: Initial Visit Performed: In Person.   Medical Diagnosis: Factor VIII -  Severe  Replacement therapy: Prophylaxis: Schedule- Hemlibra q 28 days    History (include personal factors and/or comorbidities that impact the plan of care):   Sharron who goes by his middle name \"Marco\" was last seen on 3/8/22 for a comp clinic. 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  He reports minimal physical lifting associated with his tasks.  He is also taking classes at [employer] in computer design, this is in person. He reports with the return to in person schooling, he is very busy, hence more sedentary and has not been wearing his B knee braces or performing the HEP as often as he would like.   Exercise/physical activity: Decreasing activity level.  His activity consists primarily of daily tasks and PCA tasks.  Spends much of his time sitting at his computer. He does UE resistance training such as biceps curls with 32# weight ~2x/wk and heel raises 3x/wk, wall sitting 1x/wk. Occasionally performs SLS and seated gastroc stretch..     Equipment: Patient has bilat knee subluxation braces, new braces received through [facility] in Sept 2021.  Orthopedic past medical history: none    Patient/family rehab goal: Prevent B patella from moving more superior.  Update HEP.  Improve his knee function and strength.    Chief complaint: Today he c/o increasingly superior position of R patella in setting of B patella tamia. He reports the R patella has not subluxed recently and estimates the L " patella laterally subluxes ~ 1x/month, typically when he is descending stairs quickly & that the patella reduced to his baseline patella tamia position without any intervention..  He denies pain, denies negative impact on any of his daily activities.  Symptoms have not improved, and may have worsened.  Symptoms aggravated by physical activity, end range knee extension, rapid stair descent  Symptoms improved with wearing his B knee braces & no symptoms when resting    Fall Risk Screen:  Has the patient fallen 2 or more times in the last year? No  Has the patient fallen and had an injury in the past year? No  Is the patient a fall risk? Yes, department fall risk interventions implemented.  Pt scoring at risk for falls with SLS, however, good insight into his deficits, self-corrects his balance without external assistance during balance challenges, and is agreeable to continue to work on his balance:  Safe Environment: Patient indicates no concerns regarding feeling safe at home and within his relationships.    Physical Exam:    Left Right Comments   Squat WFL WFL  only to 90 degrees   Step-over WFL WFL  mild circumduction     ROM  Age: 22 year old L Knee R Knee   Flex (PF) 151 122   Ext (DF) 0 0     Strength: 5/5 BLE with seated MMT except 4+/5 B hip flexion. Decreased core stability.   Patellar position, all measurements in resting with knee fully extended and muscles relaxed: B patella tamia.  Distance from tibial plateau to inferior border on patella: Left: 2 3/8 inch, Right: 2 1/4 inch  Medial/lateral position of patella: Left patella glides medially so the lateal border of the patella is 1/2 inch lateral to midline.  The L patella glides lateral so that the medial border of the patella aligns with midline.  R knee lateral border of patella rests 3/4 inch lateral to midline and glides medially so lateral border aligns with midline.  R knee medial border rests 1 1/2 inch medial of midline and glides lateral until  medial border of patella is 1 inch medial of midline,  Posture: Mild forward head and shoulders. Slouched seated posture, but does correct with cues.    Gait-Independent without assistive device with B knee flexion throughout gait, increased ANDERS, increased lateral weight shifting, RLE internal rotation & overpronation.      Assessment: He has known hemarthropathy in all six index joints.  He denies recent bleeding episodes. Patella tamia inadequately managed with instruction needed regarding bilateral knee braces and HEP..  Decreased muscle tone and joint stability throughout.  Decreased activity level for his age.  Muscle tightness in post leg muscles bilaterally.  Postural impairments.    Treatment diagnosis: impaired strength, endurance, balance, and patellar alignment.  Treatment: Since patella subluxation and low activity level increases risk of further damage and risk of bleeds within his joints, it is appropriate for hemophilia specific skilled PT services today to address his c/o patella subluxation in the setting of his joint hemarthropathy & bleeding disorder.  Treatment focused on joint protection today.   Self care:  Educated him on correct donning of B knee braces because on the  L knee he has not been placing/adjusting the knee lateral patellar stabilizer, thus was not adequately limiting the superior and lateral translation of the L patella.  On the R side, he had been applying the knee brace with the straps to support the patella inferior to the patella, thus may have inadvertently been causing superior glide of the R patella. He notes due to minimal physical activity as of late, he has been wearing the B knee braces quite infrequently, but agrees to increase activity, and wear B braces during aggravating activities.  Instructed in and had him manually glide patellae inferior to comfortable position prior to securing B knee braces.  Marco asking about surgery for R knee patella tamia.  Indicated  that it is rare to undergo surgery for a non-painful issue that does not impede comfort, or functional & recreational activities, however, pt may obtain a surgical consult if he desires.    Therapeutic Exercise:  Educated pt on performing aerobic activity to improve physical function and quad strength.  Instructed in the difference between resistance vs aerobic training and options for aerobic activity.  He reports having access to the [employer] gym, but that he has only used it for resistance training so far.  Today, he is very interested in trying the seated stepper.  Educated on features, fit, and use as he performed 10 minutes at varied resistance levels, mostly level 6 with O2 sats remaining in 90s and HR up to 150 bpm..  He exhibits tendency for a few minutes of intense bursts of seated stepper followed by static rest break.  Instructed in goal of continuous activity for 10+ minutes, in addition to warm up and cool down along with rationale for this, omni effort scale, and desired HR range of 60-80% (118-162.3 bpm) of his max heart rate (198 bpm), how to palpate and calculate his HR using his radial pulse, and using 30 second intervals of mildly increased speed or resistance~ q 3 minutes for high intensity interval training with Marco demonstrating understanding, but may need reinforcement.     Instructed Marco on his HEP, updating technique and difficult as needed.  Standing gastroc stretch shown as option, but he prefers to perform seated stretch with theraband which he holds for 1 1/2 minutes and is willing to perform 2-3 reps daily.  Educated he can perform this with static length object such as beach towel, sheet, or belt, though he prefers to continue with theraband.  In response to patients' questions, instructed in the pros/cons of supine gastroc stretch starting at 90/90 position versus doorway stretch vs seated hamstring stretch with pt preferring seated version with 1 min hold and agrees to 2-3  reps daily.  Pt performed seated hamstring stretch with slouched posture but after demonstration and verbal cues, switched to correct technique.  Educated in option of using a footstool or text book to modify intensity of stretch as desired with pt amenable to this. Pt demonstrates wall sit with knee adduction squeeze (uses basketball at home- used pillow in clinic) and rates as low level of difficulty, thus instructed using therapy ball between his back and the wall to roll down into position with this adding new challenge to the task and pt indicates he will see if the school gym facility has a therapy ball he can use for this exercise. For standing heel raises, pt rating as moderate difficulty and struggling to keep his balance while performing the single leg version, thus instructed to use UE support as needed and to work toward future progression of on an edge of stair for strengthening within increased ROM.  Added SLR with external rotation of the hip to target the VMO with him demonstrating understanding after cues for flexion of contralateral knee to protect his back. Educated Marco to perform these exercises more consistently and increase activity level before adding more exercises to his HEP.  Declined written info for all education provided today.    Neuromuscular reeducation for improved kinesthetic sense/body awareness and posture;   Verbal instruction in: single leg stance with progressions: decreased UE support/strategies, eyes closed, on a compliant surface such as cushion, and with ball toss/catch.  Plan for next session:  Assess HEP technique & progress difficulty, add core ex.  Plan of Care: 1.Monitor functional improvements in decreased incidence of patellar subluxation with new HEP & improved brace donning/wear. 2. Complete regular exercise program as issued today.    Therapy Frequency and Predicted Duration of Therapy Intervention: 1x every 2 wks over 13 wks Clinical Presentation:  Stable/Uncomplicated, Clinical Presentation Rationale: PMH and clinical judgment, Clinical Decision Making (Complexity): Low complexity  Goals: By 2/6/23,, Marco to   1.  Independently verbalize understanding of evaluation results, home management and home exercise recommendations provided today to maximize functional mobility and allow for continued safe participation in current home and work activities.  2.  Independently demonstrate correct donning/ wear of B knee brace  3.  Report reduction in frequency of patella subluxation  4. Demonstrate no further worsening of patella tamia position when measured in supine with knee relaxed.    Recommendations: To be scheduled for follow up physical therapy session at [facility]  Educational assessment/Barriers to learning: No barriers noted  Patient/Family Education:General information on benefits of exercise and physical activity, Aerobic activity guidelines and patellar subluxation knee brace donning/wear and Home Exercise Program  Risks and benefits of evaluation/treatment have been explained.  Patient, family and/or caregiver are in agreement with Plan of Care.  Treatment provided this date (including minutes): Self Care: 8, Therapeutic Exercise: 40, Neuromuscular: 4    Evaluation time: 24  Timed Code Treatment Minutes: 52  Total Treatment Time Minutes (sum of timed and untimed services): 76    Signature:   Jailene Santiago PT  Physical Therapist  [facility]  [phone]    Date: 11/8/22     Certification:  Onset date: 11/8/22   Start of care date: 11/8/22   Certification date from 11/8/22  to 2/6/23    I CERTIFY THE NEED FOR THESE SERVICES FURNISHED UNDER        THIS PLAN OF TREATMENT AND WHILE UNDER MY CARE (Physician co-signature of this document indicates review and certification of the therapy plan).

## 2022-11-08 ENCOUNTER — HOSPITAL ENCOUNTER (OUTPATIENT)
Dept: PHYSICAL THERAPY | Facility: CLINIC | Age: 22
Setting detail: THERAPIES SERIES
Discharge: HOME | End: 2022-11-08
Attending: PHYSICIAN ASSISTANT
Payer: COMMERCIAL

## 2022-11-08 DIAGNOSIS — D66 HEMOPHILIA (H): ICD-10-CM

## 2022-11-08 DIAGNOSIS — D66 HEMOPHILIC ARTHROPATHY (H): Primary | ICD-10-CM

## 2022-11-08 DIAGNOSIS — M36.2 HEMOPHILIC ARTHROPATHY (H): ICD-10-CM

## 2022-11-08 DIAGNOSIS — M36.2 HEMOPHILIC ARTHROPATHY (H): Primary | ICD-10-CM

## 2022-11-08 DIAGNOSIS — D66 HEMOPHILIC ARTHROPATHY (H): ICD-10-CM

## 2022-11-08 PROCEDURE — 97161 PT EVAL LOW COMPLEX 20 MIN: CPT | Mod: GP | Performed by: REHABILITATION PRACTITIONER

## 2022-11-08 PROCEDURE — 97110 THERAPEUTIC EXERCISES: CPT | Mod: GP | Performed by: REHABILITATION PRACTITIONER

## 2022-11-21 ENCOUNTER — TELEPHONE (OUTPATIENT)
Dept: PHYSICAL THERAPY | Facility: CLINIC | Age: 22
End: 2022-11-21

## 2022-12-06 ENCOUNTER — TELEPHONE (OUTPATIENT)
Dept: PHYSICAL THERAPY | Facility: CLINIC | Age: 22
End: 2022-12-06

## 2022-12-13 ENCOUNTER — HOSPITAL ENCOUNTER (OUTPATIENT)
Dept: PHYSICAL THERAPY | Facility: CLINIC | Age: 22
Setting detail: THERAPIES SERIES
Discharge: HOME | End: 2022-12-13
Attending: PHYSICIAN ASSISTANT
Payer: COMMERCIAL

## 2022-12-13 PROCEDURE — 97110 THERAPEUTIC EXERCISES: CPT | Mod: GP | Performed by: REHABILITATION PRACTITIONER

## 2022-12-13 NOTE — PROGRESS NOTES
"[facility]  OUTPATIENT PHYSICAL THERAPY HEMOPHILIA CLINIC NOTE    Sharron Kennedy \"Aj\"  YOB: 2000  MRN# [id number]   Age: 22 year old  Date of service:  12/13/22    present:No, Language: English.  Reason for visit: Treatment  Performed: In Person.    Bleeds since last visit:None  Change in symptoms: Patient reports no pain and no patellar subluxation and improvement in activity level with increased wear of B knee braces, but c/o brace uncomfortable at R medial knee, says removed the internal hinges on his L knee brace quite awhile ago with improvement of similar discomfort.  Reports did try to perform aerobic activity at [email or web address] [place] gym, but that the equipment is always in use and long wait lists.    Treatment:  Aj will benefit from skilled physical therapy to address joint hemarthropathy and home management of bleeding disorder.  Treatment consisting of:     Self care:  Educated him on correct donning of B knee braces.  On the  L knee, encouraged adjusting the superior portion of the knee lateral patellar stabilizer to limit superior translation of the patella with pt responding. Unfortunately, the brace straps don't cross this area, thus provides only moderate superior stabilization,  On the R side, he had been applying the knee brace so the hinges align with his knee joint, but in this position, the strap to support the patella is inferior to the superior border of the patella, thus limited effectiveness.  Upon removing the knee brace, revealed interior pocket for the removal of the hinge portion, which sent home with patient.  Patient reports improved comfort with hinge portion removed and displays improved position of the brace.  Instructed in and had him manually glide patellae inferior to comfortable position prior to securing B knee braces, \"Glide knee cap towards foot x 3, then apply brace with knee cap towards foot. " "(with knee relaxed)\". Pt reports he has been and will continue to  wear B braces during aggravating activities.     Therapeutic Exercise:  Instructed Marco on his HEP & gave written instructions, updating technique and difficulty as needed.  Educated to Walk laps in [place] (reports good halls for this at school) then up the stairs for interval.  Deliberate, slower stair descent. Otherwise, faster speed for 30 second interval if stairs are aggravating).    Patient performed each of these with demo, verbal and tactile cues to improve technique    Standing ankle PF (heel raises) progressed to off stair    supine (lying on back) ball pass from arms to feet with full shoulder flexion with ball and hip extension near neutral with ball,     bridging with knees squeezing the ball,     wall sit with ball behind back and squeezing a smaller ball between the knees    Straight leg raise with (hip external rotation) toe turned out and opposite knee bent (don t have to go so high on your right side if it hurts)    Seated hamstring stretch with heel on chair with 30 sec hold x 2-3 reps. Great job keeping trunk straight.    Standing gastroc (runner s) stretch with heel on the floor x 30 sec hold x 2-3 reps.  Heels on ground, feet forward  Equipment issued: None   Assessment: Sharron Kennedy reports improving symptoms, and has great enthusiasm.  Benefits from moderate cues for technique.  May benefit from the video option within PT rx to help with technique.      Plan:  Add SLS, lunges with VMO activation, controlled step downs, progress HEP.    Goals: By 2/6/23, Marco to   1.  Independently verbalize understanding of evaluation results, home management and home exercise recommendations provided today to maximize functional mobility and allow for continued safe participation in current home and work activities.  2.  Independently demonstrate correct donning/ wear of B knee brace  3.  Report reduction in frequency of patella " subluxation  4. Demonstrate no further worsening of patella tamia position when measured in supine with knee relaxed.    Charges:Treatment provided this date (including minutes): Self Care: 10,  Therapeutic Exercise: 55  Timed Code Treatment Minutes: 65    Signature:   Jailene Santiago PT Physical Therapist  [place] [facility]  [phone]    Date: 12/13/22

## 2023-01-09 ENCOUNTER — TELEPHONE (OUTPATIENT)
Dept: PHYSICAL THERAPY | Facility: CLINIC | Age: 23
End: 2023-01-09

## 2023-01-12 ENCOUNTER — TELEPHONE (OUTPATIENT)
Dept: PHYSICAL THERAPY | Facility: CLINIC | Age: 23
End: 2023-01-12

## 2023-01-16 ENCOUNTER — TELEPHONE (OUTPATIENT)
Dept: PHYSICAL THERAPY | Facility: CLINIC | Age: 23
End: 2023-01-16

## 2023-01-30 ENCOUNTER — TELEPHONE (OUTPATIENT)
Dept: PHYSICAL THERAPY | Facility: CLINIC | Age: 23
End: 2023-01-30
Payer: COMMERCIAL

## 2023-02-09 ENCOUNTER — TELEPHONE (OUTPATIENT)
Dept: PHYSICAL THERAPY | Facility: CLINIC | Age: 23
End: 2023-02-09
Payer: COMMERCIAL

## 2023-03-14 ENCOUNTER — APPOINTMENT (OUTPATIENT)
Dept: INTERPRETER SERVICES | Facility: CLINIC | Age: 23
End: 2023-03-14
Payer: COMMERCIAL

## 2023-05-08 ENCOUNTER — CARE COORDINATION (OUTPATIENT)
Dept: HEMATOLOGY | Facility: CLINIC | Age: 23
End: 2023-05-08
Payer: COMMERCIAL

## 2023-05-08 DIAGNOSIS — D66 HEMOPHILIA (H): ICD-10-CM

## 2023-05-08 DIAGNOSIS — D66 HEMOPHILIA (H): Primary | ICD-10-CM

## 2023-05-08 DIAGNOSIS — D66 HEMOPHILIC ARTHROPATHY (H): ICD-10-CM

## 2023-05-08 DIAGNOSIS — M36.2 HEMOPHILIC ARTHROPATHY (H): ICD-10-CM

## 2023-05-08 NOTE — PROGRESS NOTES
[facility]  COMPREHENSIVE CLINIC PRE-VISIT CARE COORDINATION NOTE     NAME:  Sharron Kennedy  :  2000   AGE:  22 year old    Appointment date/time:      May 16, 2023 at 830am      Assigned RN: Cele Stack RN  Diagnosis:    Severe Hemophilia A   Factor level:      Factor VIII < 1%  Inhibitor Hx:     No history of inhibitor  Viral issues:   HIV and HCV status unknown, immunized against/immune to HAV and HBV   Other health issues:  none  Planned procedures or surgeries: none known    Hemophilia treatment plan:  Hemlibra 600 mg every 28 days (Since 2019) with Advate 4000 units prn For break through bleeding  Method of logging infusions:    [employer]      Last CC or Office Visit:   Last comp clinic visit was 3/8/22, follow-up visit with PA on , follow-up PT visits  - .  Confirmation of appointment:      LICSW attempted to call patient but line gave busy signal.  Follow-up text was sent.  Goals for Visit:     Psychosocial:  Sharron lives in [place] with his parents and 6 younger siblings.    He arrived in the  from [facility] in  and lived in [place], [place], and [place] before reloating to [place] in the summer of .  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   Orthopedic past medical history: None  Imaging: 10/19/2015 X rays of all 6 index joints: R elbow: slight narrowing  of joint space, slight overgrowth of distal humerus and proximial radius & ulna. L elbow Xray: slight narrowing of joint space, slight overgrowth radial head. Left knee unremarkable. R knee slightly irregular femoral condyles & proximal tibial epiphysis.  Narrowed tibiofemoral and patellofemoral joint spaces. R ankle calcaneal sclerotic focus likely is a bone island. L ankle possible slight narrowing of tibiotalar joint space. All 6 joints: No soft tissue abnormalities noted.  PT orders:  Placed LBB    Eligible studies: Eligible for subsequent registry with labs if factor used since last comp. visit    Genetics:  Genetics plans to meet with Sharron.  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   He has 3 sisters and 2 brothers. His two brothers (9 years of age and 12 years of age), both have hemophilia. It is unclear if his sisters are carrier of hemophilia.    Preliminary Bleed Count:     Reported bleeding events in [facility]/Epic/Per Pharmacy Records since last comprehensive clinic. None per [facility]      HPI      ROS      Physical Exam

## 2023-05-08 NOTE — TELEPHONE ENCOUNTER
*Rx needed by: 05/09/2023    *Last comp visit: 03/08/2022 (scheduled 5/16/23)    *Refills left: 0    *Notes: -    Mitali Li, Pharmacy Coordinator   [facility]  [phone]

## 2023-05-16 ENCOUNTER — TELEPHONE (OUTPATIENT)
Dept: HEMATOLOGY | Facility: CLINIC | Age: 23
End: 2023-05-16
Payer: COMMERCIAL

## 2023-07-06 NOTE — PROGRESS NOTES
"PHYSICAL THERAPY EVALUATION  Type of Visit: Evaluation, Comp: Hemarthropathy all 6 index joints, B patella tamia    See electronic medical record for Abuse and Falls Screening details.    Subjective      Presenting condition or subjective complaint:    Date of onset: 07/18/23    Relevant medical history:     Dates & types of surgery:      Prior diagnostic imaging/testing results:       Prior therapy history for the same diagnosis, illness or injury:        Prior Level of Function   Transfers: Independent  Ambulation: Independent  ADL: Independent  IADL: Driving, Finances, Housekeeping, Laundry, Meal preparation, Medication management, School, Work    [facility]  OUTPATIENT PHYSICAL THERAPY HEMOPHILIA CLINIC NOTE    Sharron Kennedy    YOB: 2000  MRN# [id number]    Age: 22 year old         present:No, Language: English.  Referring provider:  Aminata Manrique PA-C   Reason for visit: Comprehensive Clinic. Performed: In Person.  Assigned RN: Cele Stack RN  Diagnosis:    Severe Hemophilia A   Factor level:      Factor VIII < 1%  Inhibitor Hx:     No history of inhibitor  Viral issues:   HIV and HCV status unknown, immunized against/immune to HAV and HBV   Other health issues:  none  Planned procedures or surgeries: none known    Hemophilia treatment plan:  Hemlibra 600 mg every 28 days (Since 9/2019) with Advate 4000 units prn For break through bleeding  Method of logging infusions:    [employer]      Bleeds: See referring provider's note.  Work/school/family: Sharron at times goes by his middle name \"Aj\". He lives in [place] with his parents and 6 younger siblings. 6 younger siblings (3 sisters, 3 brothers - two of which are affected with hemophilia as well).  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  He reports minimal physical lifting associated " with his tasks.  He is also taking in person classes at [employer] in computer design.    He has lived in [place], [place], arrived in  in 2010 and lived in [place], [place], and [place] before reloating to [place] in 2015. Drives self.  Exercise/physical activity:  His activity consists primarily of daily tasks and PCA tasks.  Spends much of his time sitting at his computer. He does UE resistance training such as biceps curls with 35# weight ~2x/wk and heel raises 3x/wk, wall sitting 2x/wk. Occasionally performs SLS, has not performed seated gastroc stretch.  Equipment: Patient has access to cold pack, Patient has bilat knee subluxation braces with most recent pair from [employer] in Sept 2021.     Interval History (include personal factors and/or comorbidities that impact the plan of care):   Last comp clinic visit was 3/8/22, follow-up visit with PA on 8/18/22, PT visits Nov-Dec 2022 for B patella tamia.  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   Orthopedic past medical history: None documented in chart, however, pt reports he thinks he fractured his Right medial knee in 2006 or 2006 in [place] & had surgical intervention, but plans to ask his mother for details.   Imaging: 10/19/2015 X rays of all 6 index joints: R elbow: slight narrowing of joint space, slight overgrowth of distal humerus and proximial radius & ulna. L elbow Xray: slight narrowing of joint space, slight overgrowth radial head. Left knee unremarkable. R knee slightly irregular femoral condyles & proximal tibial epiphysis.  Narrowed tibiofemoral and patellofemoral joint spaces. R ankle calcaneal sclerotic focus likely is a bone island. L ankle possible slight narrowing of tibiotalar joint space. All 6 joints: No soft tissue abnormalities noted  Bleeds: See referring provider's note.    Patient/family rehab goal: Improve physical activity     Reported Pain/Stiffness:  Denies today and several months prior    Fall Risk Screen:  Has the patient fallen 2 or more times in the last year? No  Has the patient fallen and had an injury in the past year? No  Is the patient a fall risk? Yes, department fall risk interventions implemented, R SLS is less than 1 sec    Bleeding history-lifetime (patient recall, ATHN dataset categories)  L = Left  R = Right 0 1-3 4-19 20+ Unable to Recall Comments   Shoulder R L - 1? Says was an injury       Elbow    L,R     Hip L,R        Knee L,R        Ankle  L,R    Once each side     Physical Exam:   ROM (negative = hyperext, 0 = neutral, positive = flexion. Ankle DF: negative = unable to attain neutral)  22 year old  Age                        22 Current    Current   Current    Current   Current    Current      L Elbow Norm- Prior- Contra- R Elbow Norm- Prior- L Knee Norm- Prior- Contra- R Knee Norm- Prior- L Ankle Norm- Prior- Contra- R Ankle Norm- Prior-   Flex (PF) 152 18 1 25 127 -7 -7 148 23 -3 15 133 8 11 57 18 7 4 53 14 3   Ext (DF) 33 26 -8 5 38 31 3 0 -4 0 0 0 -4 0 2 1 1 4 -2 -3 0   Pronation decreased    mildly decreased                  Supination WFL    WFL                     Hemophilia Joint Health Score 2.1 -Summary Score Sheet   2006, International Prophylaxis Study Group   HJHS Total Score 21 out of max of  124                 Joint Score          17 L Elbow R Elbow L Knee R Knee L Ankle R Ankle    Swelling 0 0 0 0 0 0    Duration Swelling 6mo 0 0 0 0 0 0    Muscle Atrophy 0 0 0 1 0 0    Crepitus on Motion 0 0 2 2 0 0    Flexion Loss 0 3 0 2 0 0    Extension Loss 3 3 0 0 0 1    Joint Pain 0 0 0 0 0 0    Strength 0 0 0 0 0 0    Individual Joint Total 3 6 2 5 0 1     NE =  Non-Evaluable  Swellin = No swelling, 1 = Mild, 2 = Moderate, 3 = Severe  Duration: 0 = No swelling or < 6 months, 1 = >/= 6 months  Muscle Atrophy:  0 = None, 1 = Mild, 2 = Severe  Crepitus on Motion:  0 = None,1 = Mild, 2 = Severe  Flexion Loss Contralateral: 0 = <5  degrees, 1 = 5-10 degrees, 2 = 11-20 degrees, 3 = > 20 degrees  Extension Loss Contralateral: 0 = <5 degrees, 1 = 5-10 degrees, 2 = 11-20 degrees, 3 = > 20 degrees  Joint Pain: 0 = No pain through active ROM, 1 = No pain through active range, only pain on overpressure or palpation, 2 = Pain through active range  Strength, Within Available Range:  0 = Holds test position against gravity with max resistance (gr 5), 1 = Holds test position against gravity with mod resistance, but breaks with maximal resistance (gr 4), 2 = Holds test position with min resistance (gr 3+) or holds test position against gravity (gr 3), 3 = Able to partially complete ROM against gravity (gr 3-/2+), or able to move through ROM gravity eliminated (gr 2), or through partial ROM gravity eliminated (gr 2-), 4 = Trace (gr 1) or no muscle contraction (gr 0)  Ankle PF: 0= 4-5 reps PF, 1= 2-3, 2= clears heel, 3= full AROM grav elim, 4= trace or no musclecontraction    Global Gait (Walking, Stairs, Running, Hopping on 1 leg): Score: 4  0 = All skills are within normal limits, 1 =  One skill is not within normal limits, 2 = Two skills are not within normal limits, 3 = Three skills are not within normal limits, 4 = No skills are within normal limits, NE = Non-evaluable  N=Normal, L=limp, TW=toe walking, WSF=walking on side of foot, US=uneven strides, NPO=no push off, STARLA=abnormal weight shift, FTO=foot turned out  Ankle-equal weight shift, heel-toe pattern, PF push-off, steps of equal length and tio, toes symmetrically forward    Knee-Equal weight shift, HS with full ext, knee ext push-off, steps of equal length and tio, toes)    Function Left Right Comments   Hand to mouth WFL WFL    Hand behind head WFL WFL    Hand behind low back WFL WFL    Figure 4 WFL WFL    Squat WFL WFL Only to 90 degrees, rises up on toes   Step-over WFL WFL Mild circumduction   Functional mobility: Independent transfers without AD. Reports independent  floor  transfer.  Gait: -Independent without assistive device.  B knee flexion throughout gait, increased ANDERS, increased lateral weight shifting, RLE internal rotation & overpronation.  Equal step length, equal time in stance, good tio, stable with no balance concerns.  Adjusts speed and negotiates obstacles with mild difficulty  Stairs: Independent. Ascends with 1-2 stairs at a time with reciprocal pattern, no rail, good speed.  Descends with reciprocal pattern, no rail, but slower, inconsistent speed and decreased eccentric quad control.   Posture/Alignment: Forward head, mild bilateral forward shoulder and bilateral shoulder internal rotation, R forefoot adduction and mild RLE valgus   Muscle Length:   Hamstring length (+ if > 20  short of full knee extension):  L + & 56 degrees short of full knee ext, R+ & 63 degrees short of full knee ext  Gastrocnemius length (+= does not attain bench logan: contralateral knee touch wall 5 cm ahead of front toes while keeping B heels on floor, knee not crossing midline, neutral LE & pelvic rotation, no overpronation): L +, R+ by ~10 cm  Soleus length (+= does not attain bench logan: contralateral knee touch wall 5 cm ahead of front toes while keeping B heels on floor, knee not crossing midline, neutral LE & pelvic rotation, no overpronation):  L +, R+ by ~10 cm  Single leg stance: Left (sec) 30+, Right (sec) <1 with eyes open.  Patient's age: 22 year old Normative data from Hobbs B, Javier R, Thomas T, et at. 2007 and Nuvia et. Al. 2016. Eyes open:  Age                 Time: Men (seconds)       Time: Women (seconds)  20-29                           66.4                                       72.7      30-39                           66.6                                       67.5  40-49                           60.2                                       58.9  50-59                           50.3                                       50.7  60-64                           33.8                                        39.5  65-69                           33.8                                       30.4  70-79                           25.9                                       16.7       80-99                            8.7                                        10.6    Eyes closed:  Age        Time (seconds) (std dev):  20-49  20.7  (9.5)  50-59   6.1    (4.7)  60-69  2  (4.7)  70-79  1 (3.9)    Treatment:  Sharron Rocha will benefit from skilled physical therapy to address joint hemarthropathy and home management of bleeding disorder.  Treatment consisting of:     Self care / Home management training education: daily symptom control techniques and flare management;   -Provided instruction on signs/symptoms and plan of care for acute joint and muscle bleeding episodes     Therapeutic activities to achieve improved functional performance in daily activities through use of self care including pacing and energy conservation, good body mechanics and restorative positioning (neutral posture and position changes);   -Instructed in pacing with incorporation of short intervals of activity and frequent position changes and rest breaks into the day. Continued regular activity and joint movement is recommended for joint health, avoid prolonged static positioning.  -Educated in healthy computer use, including alignment, posture, taking breaks, and potential equipment to support healthy computer use.    Therapeutic exercise to develop: strength, endurance, flexibility and core stability through HEP instruction.  -Reviewed current exercise program and encouraged continued regular cardio, strengthening and stretching.   -Educated about the importance of having a strong musculoskeletal system with long, strong muscles by incorporating stretching and resistance exercises into his weekly routine.  -Educated about the importance of supportive footwear, instructed to wear laced up shoes with adequate arch &  ankle support and good non-slip tread.   -Educated in federal guidelines of at least 150-300 minutes of moderate aerobic activity/wk (ex: 30 min 5x/wk)or 75 to 150 min of vigorous aerobic activity/wk . If health conditions prevent meeting this, move more and sit less, avoid inactivity, and engage in regular physical activity within current abilities. Any physical activity has some health benefits. dior term benefits include lower risk or improved management of marian health, cancer, falls, type 2 diabetes, depression, & ADHD.  -Educated in strategies for exercise adherence: options for physical activity, realistic goals,  track participation, recognize progress, make it fun,   -Instructed in the following exercises with demonstration of technique and cues for technique. Pt demonstrated and verbalized understanding.   Exercise Name: Toe Raises, Sets: 1-3 - Reps: 10+ - Sessions: 1, Notes: When this becomes too easy, perform with heels hanging over the edge of a stair.  Exercise Name: Doorway Hamstring Stretch, Sets: 1 - Reps: 2-3 - Sessions: 1-2, Notes: Hold 30-60+ seconds, perform  one of the hamstring stretches - choose the one you prefer  Exercise Name: Seated Hamstring Stretch, Sets: 1 - Reps: 2-3 - Sessions: 1-2, Notes: Hold 30-60+ seconds. Perform  one of the hamstring stretches - choose the one you prefer  Exercise Name: Standing Gastroc Stretch, Sets: 1 - Reps: 2-3 - Sessions: 1-2, Notes: Heels must stay down on the ground  Exercise Name: Sit to Stand, Sets: 1-2 - Reps: 10+ - Sessions: 1-2, Notes: Focus on slow, controlled motion. Lower self to ~ 1 inch above the seat of the chair.  Without actually sitting down, stand back up.   Exercise Name: Wall Sit with Ball, Sets: 1-3 - Reps: 10+ - Sessions: 1, Notes: Keep up the good work  Exercise Name: Education Sheet General, Notes: - For safety, all exercises must be performed close to a support surface (countertop, standing with back in a corner, narrow hallway, or  "someone guarding you)  - If instructions are not clearly understood, clarify with therapist before attempting to perform.  - Jailene PT: [phone], DENYS : [phone]  - Exercises & these variations are only to be performed if can do so safely.  Only change one item at a time.  You may need someone to guard you the first time you try a new option.      Variations:    Hand support: Both hands... One hand... Hands hovering over support surface... Arms out at shoulder level to create a \"T\"... Arms at your sides... Arms crossed over        chest.    Eyes: Open...Closed    Surface you are standing on: Flat floor... Pillow or Cushion... Balance disc    Toss/catch: Toss items into bin or box (may need to ask someone else to  items that fall short)  Throw a ball against a wall to yourself... Play catch with a        friend (if you don't have a ball, can used balled up socks)  Exercise Name: Tandem Stance, Sets: 1-3 - Reps: 2-3+ - Sessions: 1-2, Notes: Hold 30-60+ seconds  Exercise Name: Single Leg Stance Supported and Unsupported, Sets: 1-3 - Sessions: 1, these last two for ankle stability and strengthening.  Exercise Name: Elbow Active Range of Motion Combined Extension and Flexion, Notes: You don't have to do this all at once as an exercise.  Instead, fully bend and straighten your elbow frequently throughout the day without straining. Same for rotating your palms up towards the ceiling and down towards the ground. Purpose: keep the elbow and forearm motion you have & not lose motion.      Gait training:Instructed in heelstrike and with cognitive effort, performed during gait x 8 ft.    Equipment issued: None     Plan of Care:   1. Patient to trial recommendations & initiate home exercise program.  2. Will plan to see at next scheduled clinic visit and be available in the interim for any questions/concerns related to bleeding disorder management.      Therapy Frequency and Predicted Duration of Therapy " Intervention: One session evaluation and treatment    Goals: Patient verbalizes understanding of evaluation results, home management and home exercise recommendations provided today to maximize functional mobility and allow for continued safe participation in current home and work activities. -Goal Met    Assessment & Plan   CLINICAL IMPRESSIONS   Medical Diagnosis: Hemophilia    Treatment Diagnosis: Hemophilia   Impression/Assessment: Patient is a 22 year old male with elbow, knee and R ankle complaints.  The following significant findings have been identified: Decreased ROM/flexibility, Decreased joint mobility, Impaired balance, Impaired gait, Impaired muscle performance, Decreased activity tolerance and Impaired posture. These impairments interfere with their ability to perform recreational activities as compared to previous level of function.  He has known hemarthropathy in all six index joints.  He denies recent bleeding episodes. Patella tamia inadequately managed with instruction needed regarding bilateral knee braces and HEP..  Decreased muscle tone and joint stability throughout.  Decreased activity level for his age with c/o very busy with computer coding classes.  Muscle tightness in post leg muscles bilaterally.  Postural impairments.      Clinical Decision Making (Complexity):   Clinical Presentation: Stable/Uncomplicated  Clinical Presentation Rationale: based on medical and personal factors listed in PT evaluation  Clinical Decision Making (Complexity): Low complexity    PLAN OF CARE  Treatment Interventions:  Interventions: Gait Training, Neuromuscular Re-education, Therapeutic Activity, Therapeutic Exercise, Self-Care/Home Management    Long Term Goals     PT Goal 1  Goal Identifier: Hemo Comp  Goal Description: Patient verbalizes understanding of evaluation results, home management and home exercise recommendations provided today to maximize functional mobility and allow for continued safe  participation in current home and work activities.  Goal Progress: Met today      Frequency of Treatment: one time eval and treat  Duration of Treatment: today only    Recommended Referrals to Other Professionals:   Education Assessment:   Learner/Method: Patient;Listening;Demonstration;Pictures/Video;No Barriers to Learning    Risks and benefits of evaluation/treatment have been explained.   Patient/Family/caregiver agrees with Plan of Care.     Evaluation Time:     PT Eval, Low Complexity Minutes (19529): 23      Signing Clinician: Jailene Santiago PT      [facility]                                                                                   OUTPATIENT PHYSICAL THERAPY      PLAN OF TREATMENT FOR OUTPATIENT REHABILITATION   Patient's Last Name, First Name, Sharron Hoover YOB: 2000   Provider's Name   [facility]   Medical Record No.  [id number]     Onset Date: 07/18/23  Start of Care Date:       Medical Diagnosis:  Hemophilia      PT Treatment Diagnosis:  Hemophilia Plan of Treatment  Frequency/Duration: one time eval and treat/ today only    Certification date from   to    Day until tomorrow       See note for plan of treatment details and functional goals     Jailene Santiago, PT                         I CERTIFY THE NEED FOR THESE SERVICES FURNISHED UNDER        THIS PLAN OF TREATMENT AND WHILE UNDER MY CARE     (Physician attestation of this document indicates review and certification of the therapy plan).                  Referring Provider:  Aminata Kovacs      Initial Assessment  See Epic Evaluation-

## 2023-07-11 ENCOUNTER — TELEPHONE (OUTPATIENT)
Dept: HEMATOLOGY | Facility: CLINIC | Age: 23
End: 2023-07-11
Payer: COMMERCIAL

## 2023-07-11 NOTE — PROGRESS NOTES
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     Sharron was diagnosed at the age of 1 while living in [place]. He had received FFP and possibly factor VIII concentrates on demand as a child. He moved to [place] in  and was placed on demand factor VIII replacement therapy. In , he started on prophylaxis regimen with Advate 20 unit(s) three days a week. He did have a port from 3495-3096 but this was removed due to infection.  In , he moved to [place] and established care at [facility]. He underwent circumcision in  with factor replacement and tolerated this well without any bleeding complications. His dose frequency was changed to every other day in  due to worsening pain and breakthrough bleeding. In 2019, he was transitioned to Hemlibra. He is currently on once monthly dosing regimen at 585mg. He uses Advate 4000 unit(s) as needed for breakthrough bleeding.     Joint History:  He has documented hemophilic arthropathy of bilateral elbows, knees and ankles.   He has no history of prior orthopedic procedures.   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 He has not endorsed any chronic pain.  He has met with our physical therapist for exercise program and bracing evaluation.   Wears  shoe inserts.     Infectious Disease History:  Hepatitis A vaccines: Immune, 2010  HIV Status: Negative 2022  HCV Status and treatment: Negative 2022  HBV Status/vaccinations: Immune 2010    Other Pertinent Medical History:  History of Bell's palsy 2022    Current Outpatient Medications   Medication    emicizumab-kxwh (HEMLIBRA) 105 MG/0.7ML injection    emicizumab-kxwh (HEMLIBRA) 60 MG/0.4ML injection    factor VIII rAHF-PFM (ADVATE) 4000 units SOLR injection    HEMLIBRA 150 MG/ML injection    predniSONE (DELTASONE) 20 MG tablet    vitamin D3 (CHOLECALCIFEROL) 50 mcg (2000 units) tablet    White Petrolatum-Mineral Oil (REFRESH LACRI-LUBE) OINT    valACYclovir (VALTREX) 1000 mg tablet     No current facility-administered medications for this visit.       Allergies   Allergen Reactions    Anti-Inhibitor Coagulant Complex        Family History:  Please refer to Genetics Counselor's previous note for detailed family history.    Social History:  Please refer to AUREA Bearden's note for detailed social history.      CURRENT TREATMENT REGIMEN  Prophylaxis: Inject emicizumab at 585 mg subcutaneously every 28 days for prevention of bleeds    Breakthrough/On-Demand: Inject 4,000 Units into the vein once as needed for acute hemophilia bleeding episode     INTERVAL HISTORY  Today, Sharron reports that he is doing very well. 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 He notes his most recent stressor is financial constraints due to his car needing repairs. He notes that his mother has offered to help with the costs which is a relief to him. He is no longer working at [employer].    From a hemophilia perspective, he reports that he is doing very well. He has been adherent to his Hemlibra subcutaneous injection schedule. Denies any site reactions. Reports that he could infuse factor if he needed to. He denies any breakthrough  "bleeding episodes and has not used any Advate in several years. His home doses are currently .  He has known hemophilic arthropathy of all six index joints that are stable. He denies any progression of joint symptoms. He denies any chronic pain associated with this. He historically has used braces for patella tamia and shoe inserts. With his HEP from our PT last year he has not needed to wear his braces as symptoms are well controlled.     PT today noted slightly reduced right elbow less flexion and left elbow extension. We will continue to closely monitor these as he does have bilateral contractures noted. Fortunately, he denies any associated pain with hemophilic arthropathy.     He denies any other new health concerns. He has no upcoming planned surgeries or procedures. He does not have a primary care provider or a dentist.     Interval events include:    Bleeding episodes in the past year:  Location Number of bleeds Other information related to each bleed   Ankle, Left 0     Ankle, Right 0     Elbow, Left 0     Elbow, Right 0     Hip, Left 0     Hip, Right 0     Knee, Left 0     Knee, Right 0     Shoulder, Left 0     Shoulder, Right 0     Other 0        Activities of Daily Living Assessment: Unrestricted school or work and unrestricted recreational activities     Chronic pain: Not experiencing chronic pain     Pain management: None    Primary care provider: Not currently established   Dentist: Not currently established, no acute concerns. Does have dental insurance.     No ED visits/hospitalizations in the past year.    No orthopedic procedures in the past year.     Please see note from MARVIN Rutledge regarding interval orthopedic history.       ROS:  Complete ROS is negative, except as noted above.       OBJECTIVE  Exam:  /82   Pulse 75   Temp 97.9  F (36.6  C) (Oral)   Ht 1.956 m (6' 5\")   Wt 103.7 kg (228 lb 11.2 oz)   SpO2 98%   BMI 27.12 kg/m    General: No acute " distress  Constitutional: Appears well, no distress  HEENT: Pupils equal and round. No scleral icterus or hemorrhage. Nares without evidence of telangiectasia. Mucous membranes moist with no wet purpura. Dentition overall ok with no signs of decay. No pharyngeal exudates. No lymphadenopathy, no thyromeagaly  CV: regular rate and rhythm, no murmurs  Respiratory: clear  GI: abdomen soft, nontender, without guarding or rebound. No hepatomegaly. No splenomegaly.   Mus/Skele: no edema. Bilateral elbow contractures noted. Patella tamia bilaterally. Please see Jailene Santiago DPT's note for joint evaluation.   Skin: no petechiae, no ecchymosis.  Neuro: CN II-XII intact. Normal gait. AOx3     Past Pharmacokinetic Studies:  N/A    Labs:  Registry labs reviewed.   Inhibitor screening : F8 inhibitor 0.0 BU   HIV neg   HCV neg    Imagin/2019 US L KNEE           10/2015 L ELBOW XR    10/2015 L Knee XR       ASSESSMENT  In summary, Sharron Kennedy is a 22 year old man with severe Hemophilia A, baseline factor VIII level of <1% without inhibitor history, who returns to clinic today for routine comprehensive hemophilia clinic visit. He has overall done very well on Emicizumab without any breakthrough bleeds within the last three years. He still has infusion skills. His hemophilic arthropathy is stable but we are closely monitoring his elbows due to some mild changes in ROM noted on this year's exam. He denies any chronic pain. His emicizumab dose will be adjusted to reflect his current weight. He will be prescribed an emergency dose of Advate as his prior dose is .        PLAN  Hemophilia Medications:  Inject emicizumab/Hemlibra 600mg subcutaneously every 28 days (150mg x 4 vials  = 4ml, split into two injections)    Advate 50 unit(s)/kg +/- 10% IV ~ 5000 unit(s) as needed for breakthrough bleeding every 24 hours.     His weight based dose of emicizumab should be ~615mg however he has not had any breakthrough  bleeds on 585mg dose. For ease of administration with available vial sizes, will adjust to 600mg subcutaneous every 28 days which is what he was on previously. Monitor weight trends with refills.      Hemophilic arthropathy management plan:  Home exercise program provided.   Patellar tamia bracing as needed   Supportive shoes with inserts as needed  Please refer to Jailene Santiago DPT's note for detailed hemophilic arthropathy plan.    Labs done and follow-up plan:  Registry labs completed today. Will send letter with results.     Routine Health Maintenance:  Recommended he establish care with primary care provider.   Recommended dental cleanings every 6 months.       Follow up clinic visit timing:  Follow up in 1 year, sooner if any concerns.      The following individuals have seen this patient independently today.    Jailene Santiago DPT; Cele Stack, nurse clinician, Osiris Pickardr, [facility] and a hemophilia pharmacy liaison have all seen the patient independently, please refer to their notes for their evaluation and assessment.      Dr. Pino Alvarez, staff hematologist has also seen this patient today in clinic.       Aminata Jacobo, MPH, PA-C  [facility]    HEMATOLOGY STAFF    Patient seen and evaluated as part of a shared visit.  I have reviewed the clinical history, physical exam, relevant labs, and treatment plan with the [facility], as well as other members of the comprehensive hemophilia care team.       22-year-old male with severe hemophilia A, no history of inhibitor.  Has been on emicizumab since September 2019.  Remains on monthly maintenance dosing.  Has not had any breakthrough bleeds in the last 3 years.  Has baseline hemophilic arthropathy in both ankles, knees, and elbows, as he did not receive factor prophylaxis during childhood.  Joint status is currently stable.  We will continue with his current treatment plan, and see him back in 12 months unless  new questions or concerns arise sooner.    Total time 40 minutes, including review of medical records and labs, clinic visit, and documentation.      Pino Alvarez MD  Professor of Medicine  Division of Hematology, Oncology, and Transplantation  Director, [facility]  __________________________________________________________________________________________  September 6, 2023 Addendum  Patient has 2 younger brothers, one of whom has a history of factor VIII inhibitor.      Vidhya Licona PA-C, [facility]  [facility]  [address] [place] [address], [address], [place] [id number]  Main: [phone], Fax: [phone]

## 2023-07-11 NOTE — TELEPHONE ENCOUNTER
[facility]  COMPREHENSIVE CLINIC PRE-VISIT CARE COORDINATION NOTE                NAME:                        Sharron Kennedy  :               2000        AGE:               22 year old     Appointment date/time:      2023, 8:30 AM reschedule      Assigned RN: Cele Stack RN  Diagnosis:    Severe Hemophilia A   Factor level:      Factor VIII < 1%  Inhibitor Hx:     No history of inhibitor  Viral issues:   HIV and HCV status unknown, immunized against/immune to HAV and HBV   Other health issues:  none  Planned procedures or surgeries: none known    Hemophilia treatment plan:  Hemlibra 600 mg every 28 days (Since 2019) with Advate 4000 units prn For break through bleeding  Method of logging infusions:    [employer]       Last CC or Office Visit:   Last comp clinic visit was 3/8/22, follow-up visit with PA on , follow-up PT visits  - .  Confirmation of appointment:    PA left voicemail 23 asked him to call  to confirm appointment.   Goals for Visit:   TBD  Psychosocial:  Sharron lives in [place] with his parents and 6 younger siblings.    He arrived in the  from [facility] in  and lived in [place], [place], and [place] before reloating to [place] in the summer of .  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     Medical Insurance:   Payer Type: State Programs  Payer Name: [facility]  Insurance Type: Primary  Coverage Type: All (Medical and Drug)     Pharmacy Insurance: [facility]   Specialty Pharmacy:  [facility]  Copay Assistance: Not Eligible due to pt insurance  HTC Assistance Fund: No  PDC:  0.98  Pharmacokinetics: NA on Hemlibra  Pharmacy Notes: Last filled Hemlibra 23 and 1 dose of Advate 11/10/21 which is  as of 22 - pt will need new on-demand rx  Pharmacy to see (Y/N): Yes     Joint concerns: H/o hemarthopathy all 6 index joints & B patella tamia with h/o frequent  L patellar subluxation.   Orthopedic past medical history: None  Imaging: 10/19/2015 X rays of all 6 index joints: R elbow: slight narrowing of joint space, slight overgrowth of distal humerus and proximial radius & ulna. L elbow Xray: slight narrowing of joint space, slight overgrowth radial head. Left knee unremarkable. R knee slightly irregular femoral condyles & proximal tibial epiphysis.  Narrowed tibiofemoral and patellofemoral joint spaces. R ankle calcaneal sclerotic focus likely is a bone island. L ankle possible slight narrowing of tibiotalar joint space. All 6 joints: No soft tissue abnormalities noted.  PT orders:  Placed LBB     Eligible studies: Eligible for subsequent registry with labs if factor used since last comp. visit, COVID-19 antibody testing, ATHNdataset     Genetics:  Genetics plans to meet with Sharron.  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 His two brothers (9 years of age and 12 years of age), both have hemophilia. It is unclear if his sisters are carrier of hemophilia.     Preliminary Bleed Count:     Reported bleeding events in [employer]/Per Pharmacy Records since last comprehensive clinic. None per [facility]

## 2023-07-18 ENCOUNTER — OFFICE VISIT (OUTPATIENT)
Dept: HEMATOLOGY | Facility: CLINIC | Age: 23
End: 2023-07-18
Attending: PHYSICIAN ASSISTANT
Payer: COMMERCIAL

## 2023-07-18 ENCOUNTER — DOCUMENTATION ONLY (OUTPATIENT)
Dept: HEMATOLOGY | Facility: CLINIC | Age: 23
End: 2023-07-18

## 2023-07-18 ENCOUNTER — THERAPY VISIT (OUTPATIENT)
Dept: PHYSICAL THERAPY | Facility: CLINIC | Age: 23
End: 2023-07-18
Attending: PHYSICIAN ASSISTANT
Payer: COMMERCIAL

## 2023-07-18 VITALS
DIASTOLIC BLOOD PRESSURE: 82 MMHG | SYSTOLIC BLOOD PRESSURE: 130 MMHG | HEIGHT: 77 IN | BODY MASS INDEX: 27 KG/M2 | WEIGHT: 228.7 LBS | TEMPERATURE: 97.9 F | HEART RATE: 75 BPM | OXYGEN SATURATION: 98 %

## 2023-07-18 DIAGNOSIS — M36.2 HEMOPHILIC ARTHROPATHY (H): ICD-10-CM

## 2023-07-18 DIAGNOSIS — D66 HEMOPHILIA (H): ICD-10-CM

## 2023-07-18 DIAGNOSIS — D66 HEMOPHILIC ARTHROPATHY (H): ICD-10-CM

## 2023-07-18 DIAGNOSIS — D66 HEMOPHILIA (H): Primary | ICD-10-CM

## 2023-07-18 DIAGNOSIS — Q68.2 PATELLA ALTA: ICD-10-CM

## 2023-07-18 PROCEDURE — 97110 THERAPEUTIC EXERCISES: CPT | Mod: GP | Performed by: REHABILITATION PRACTITIONER

## 2023-07-18 PROCEDURE — 97161 PT EVAL LOW COMPLEX 20 MIN: CPT | Mod: GP | Performed by: REHABILITATION PRACTITIONER

## 2023-07-18 PROCEDURE — G0463 HOSPITAL OUTPT CLINIC VISIT: HCPCS

## 2023-07-18 PROCEDURE — 99215 OFFICE O/P EST HI 40 MIN: CPT | Performed by: INTERNAL MEDICINE

## 2023-07-18 RX ORDER — ANTIHEMOPHILIC FACTOR (RECOMBINANT) 3000 (+/-)
5000 KIT INTRAVENOUS
Qty: 5000 EACH | Refills: 0 | Status: SHIPPED | OUTPATIENT
Start: 2023-07-18

## 2023-07-18 NOTE — PATIENT INSTRUCTIONS
Emergency Treatment Recommendations    Date: 2023    Name: Sharron Kennedy  MRN: [id number]     : 2000  Diagnosis:  Severe Hemophilia A  Baseline factor VIII level: <1 %  Inhibitor status: No history of inhibitor  Patient weight: 104kg  Current treatment:  Hemlibra 600mg every 28days  Half-life information:  unknown    For bleeding events:   Treat with: recombinant factor VIII 50 units/kg   Factor replacement should be administered emergently and prior to imaging or any invasive/surgical procedure   For suspected intracranial hemorrhage or life / limb threatening bleed, draw a peak factor VIII level 5-10 minutes after administration   For mucosal bleeding, antifibrinolytics may be useful; these are contraindicated in hematuria  Aminocaproic acid (Amicar) 50mg/kg IV or PO q 6 hours  Tranexamic acid (Lysteda) 1300 mg po TID or 10mg/kg IV q 8 hours     A single dose of factor replacement will temporarily correct the bleeding disorder-- further dosing may be required.  For guidance, please contact [facility]: [phone]  After Hours please call [phone] and ask for hematologist on call      Factor Plan:   if on prophy treatment with factor, call with any breakthrough bleeding for possible dose adjustment.  If only treating for bleeds, please call if more than 2 bleeds in one joint in 3 month period.  For emergency head trauma, or signs of serious bleeing,  please treat with at least 50 units/kg to boost your factor level to 100%.  Please seek emergency care for head CT scan.  Continue to keep infusion records via [employer]    General Recommendations:  We recommend establishing care with a primary doctor. There is a [facility] in [place]. You can call to schedule or schedule online at; [email or web address]    See dentist every 6 months. Call the [facility] for recommendations for dental visits that  "involve more than an examination and cleaning. If you have had a joint replacement or port for infusions, please call for antibiotic recommendations for dental cleaning.    Wear medic alert on your person for highest level of safety [email or web address]  Carry factor concentrate with you at all times. Call the center if you will be traveling out of the area. We can create a travel letter prior to your departure . For treatment centers around the world go to [email or web address], click on top right link \"RESOURCES\" and then scroll down to  \"Find a Treatment [place]\". Enter country, then scroll down to city closest to destination.  For any questions, your nurse clinician is Cele Stack RN, BSN, [phone] .  If Cele is unavailable and you have immediate concerns, please call [phone] and ask for a nurse.   If you have emergency needs in the evening or weekend, please call [phone] and ask for the hematologist on call. Please return for comprehensive clinic in 1 year.    Patient Education & Resources  If you would like further information about your bleeding disorder, the following links may be of help:  The National Hemophilia Foundation (includes HANDI educational resource link)   Www.hemophilia.org  The World Federation of Hemophilia (includes Global Treatment [place] Directory)   Www.wfh.org    Additional Patient Information  Here is the information reviewed you today regarding [facility] patient choice information:    Per the 2005 [employer] Manual for Participating in the Drug Pricing Program Established by Section 340B of the Public Health Service Act I. Freedom of Choice Regarding Factor Replacement Products and Avoidance of Conflict of Interest It is a requirement of the [facility] National Hemophilia Program (NHP) that all [facility] funded hemophilia treatment centers (HTCs) have a \"Freedom of Choice\" policy where patients are informed of choices they have regarding factor replacement products (FRP) and where these products might " be purchased. It is important that this policy be exercised with all patients and it is especially important that this policy be exercised by [facility] funded HTCs that sell FRP since income generated from this activity is used to further the provision of services by the [employer]. To avoid any appearance of conflict of interest, patients must be informed about their choices and be encouraged to make whatever decision they desire.

## 2023-07-18 NOTE — PROGRESS NOTES
Sharron Kennedy  [id number]  2000    Teaching Flowsheet   Sharron Kennedy  has a diagnosis of hemophilia A with a baseline factor VIII level of  <1%% . Sharron Kennedy  presents today for his comprehensive Hemophilia clinic evaluation.    Teaching Topic: annual visit     Learning Assessment  Person(s) involved in teaching:   Patient     Motivation Level:  Asks Questions: Yes  Eager to Learn: Yes  Cooperative: Yes  Receptive (willing/able to accept information): Yes       Patient demonstrates understanding of the following:  Reason for the appointment, diagnosis and treatment plan: Yes  Knowledge of proper use of medications and conditions for which they are ordered (with special attention to potential side effects or drug interactions): Yes  Which situations necessitate calling provider and whom to contact: Yes    Pain management techniques: Yes  How and/when to access community resources: Yes     Nursing Summary  Sharron Kennedy uses Hemlibra and treats prophylactically every 28 days.  He treats himself using independent subcutaneous injections and his typical dose is 600mg. He keeps track of his infusions by using [employer].    Patient's goal for today's visit is: annual visit    Other Health Maintenance  Patient does not have a primary care provider and was encouraged to consider one  Patient does not have a dentist- was encouraged to get an appointment    Nursing Education Provided:  - Reviewed procedure for home infusions of factor concentrates.  See attached treatment recommendations.   - Reviewed severe/life threatening hemorrhage and handout given that recommends appropriate dosing.  If suspect bleeding in head, neck, throat or abdomen, give dose of factor if able, contact the hemophilia center immediately or report to the Emergency Room at [facility] or the nearest emergency room.   - Discussed need for dental check-ups and prophylactic antibiotics for dental  procedures.   - Reviewed resources/weblinks with patient.    The National Hemophilia Foundation (HANDI educational resource request)   Www.hemophilia.org    The World Federation of Hemophilia (Find a treatment center)   [email or web address]  -Patient was given [facility] Contact Card.

## 2023-07-18 NOTE — PROGRESS NOTES
I met with Sharron Kennedy on 2023 during his annual comprehensive clinic visit with the [facility].    We discussed his current factor plan of Hemlibra 585mg every 28 days and Advate on-demand.  He has gained some weight so his dose is increasing to 615mg every 28 days.  We will send out the new dose at next refill. He said he has been on that dose before and didn't have any questions about the change.  We will remind him about the change and vials coming when we set up the refill. The Advate he has on hand  22 so we will get another dose ready for him and deliver once PA is approved.  He has not needed Advate he says since 2018 but we reminded him that it is still good to have a dose on hand. When asked he did need anything while in clinic today, he also didn't have any questions or concerns today. He had forgot to log his last dose and didn't know he could go back and log a past dose.  I showed him how to log a past infusion and he got it logged.     Does Pt currently log? yes   Type of logs: [employer]  Outcome of logging? Logs well    We discussed the HRSA Statement and the patient fully understood.    I spent 8 minutes face to face in clinic today with Sharron Kaminski, Pharmacy Coordinator   [facility]  [phone]

## 2023-07-18 NOTE — PROGRESS NOTES
"2023    Presenting Information:   Sharron Kennedy was seen for a [facility]. I met with him to introduce myself, obtain his family history, and review the inheritance and genetics of hemophilia A.    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 Please see Aminata Jacobo, MPH, PA-C s note for details regarding his medical history.     Family History:   A three generation pedigree, specific to bleeding and hemophilia was obtained today and scanned into the EMR. The following information is significant:     Sharron does not have any full siblings. He has 6 maternal half siblings (3 sisters and 3 brothers).    2 of his maternal half brothers have hemophilia. One of the half brothers with hemophilia also has Autism.       His father  a couple decades ago in an accident, but Sharron did not know any additional details.    His mother is an obligate carrier. She has 1 sister and 4 brothers. None of them have hemophilia or bleeding symptoms.    His maternal grandfather  of \"old age\" about a year ago.    His maternal grandmother is alive and has no history of bleeding.     The family history is otherwise negative for hemophilia.    Maternal ancestry is [place].  Paternal ancestry is Lebanese and [place].  There is no reported consanguinity.    Discussion:   We reviewed that mutations in the F8 gene that cause hemophilia A are inherited in an X linked recessive pattern. The F8 gene is located on the X chromosome. Men with hemophilia will always pass on their X chromosome to each daughter and their Y chromosome to each son. For Sharron, this means that all of his daughters will be carriers of hemophilia A, but none of his sons will have hemophilia A, unless his partner is a carrier or there is a de vianney (new) " mutation in one of his children. We discussed that for women who are carriers of hemophilia with an unaffected partner, each of her daughters has a 50% chance of being a hemophilia carrier and a 50% chance of being an unaffected non-carrier. Each of her sons has a 50% chance of having hemophilia and a 50% chance of being unaffected.     We discussed that other family members may be carriers of hemophilia A.  We discussed that some carrier women have low enough factor VIII levels to cause bleeding issues. Typically these bleeding concerns are mild. We discussed that any carrier or possible carrier in his family (including his half sisters) can have their factor VIII level checked to assess for bleeding risk. A normal factor level, however, does not discount possible carrier status, as carrier women can have normal factor levels.     Sharron mentioned that he would like to prevent his descendants from having hemophilia if possible. We discussed the options of adoption, sperm donation, and IVF with preimplantation genetic testing and embryo selection for him and/or his future carrier daughters.    Plan:   1. A three generation pedigree was obtained and scanned into the EMR.  2. He will meet with other members of our team today. Please see their notes independently.   3. Contact information was provided today. Additional questions or concerns were denied.    Approximate Time Spent in Consultation: 25 minutes.     Osiris Mosquera, PhD, MS, [facility]  Licensed, Certified Genetic Counselor  [phone]    CC: No Letter

## 2023-07-19 RX ORDER — EMICIZUMAB 150 MG/ML
600 INJECTION, SOLUTION SUBCUTANEOUS
Qty: 4 ML | Refills: 11 | Status: SHIPPED | OUTPATIENT
Start: 2023-07-19

## 2024-03-15 ENCOUNTER — TELEPHONE (OUTPATIENT)
Dept: HEMATOLOGY | Facility: CLINIC | Age: 24
End: 2024-03-15

## 2024-03-15 NOTE — TELEPHONE ENCOUNTER
[facility]  Brief Social Work Note    Received update from pharmacy that the pt's MA lapsed.  Writer texted him this information on 2/1 and 2/8.    Received update from pharmacy today that patient called for a refill and stated he was not aware his insurance lapse. He requested a call from  to discuss next steps.    Call placed to Sharron ([phone]) and a detailed voicemail was left with information on obtaining a new MA application and where to call for assistance ([facility] Team at [phone]).  Left call back # incase further questions or guidance is needed.  Also [facility] and suggested this be included in his refill if able.    Rosa M Jaffe, [facility]  Clinical   [facility]  Office: [phone]

## 2024-03-19 DIAGNOSIS — D66: Primary | ICD-10-CM

## 2024-03-19 RX ORDER — EMICIZUMAB 150 MG/ML
600 INJECTION, SOLUTION SUBCUTANEOUS
Qty: 4 ML | Refills: 11 | Status: SHIPPED | OUTPATIENT
Start: 2024-03-19

## 2024-05-29 ENCOUNTER — TELEPHONE (OUTPATIENT)
Dept: HEMATOLOGY | Facility: CLINIC | Age: 24
End: 2024-05-29

## 2024-05-29 NOTE — TELEPHONE ENCOUNTER
[place] [facility]    Outreach to patient via text to offer a 3-way call to work on clarifying insurance options.  Patient stated he would like assitance and plans made for follow-up on  at 10am.    Writer additionally [facility] to [facility] in effort to seek clarification on pt's current insurance circumstances.    AUREA Reaves  Clinical   [place] [facility]  Office: [phone]      Addendum :  Patient gave writer permission to share his name/ with [facility] to try to understand his insurance circumstances. Writer communicated with Susy Tabor, .  Susy was able to confirm that patient had an appointment with [facility] in April. It was determined at that time that the patient is over income for Medical Assistance. [facility] had offered to assist patient with a MNSure plan, but patient opted to pursue options on his own.  .   [facility] team was made aware of pt's new plan option in early May.  It was discovered that this plan covers preventative care only, and not specialty care or pt's medication.  Per [facility], this plan is not a MNSure plan.  [facility] has offered an 8am appointment tomorrow morning with patient to apply for a MNSure plan, prior to expiration of his SEP at midnight.      Writer spoke with patient today to confirm current income and confirmed he is over income for Medical Assistance. Writer later called patient and texted patient with the opportunity to choose a new insurance plan with the help of a navigator at 8am tomorrow.  Await response from patient.  Rosa M/AUREA

## 2024-05-31 ENCOUNTER — TELEPHONE (OUTPATIENT)
Dept: HEMATOLOGY | Facility: CLINIC | Age: 24
End: 2024-05-31

## 2024-05-31 ENCOUNTER — DOCUMENTATION ONLY (OUTPATIENT)
Dept: HEMATOLOGY | Facility: CLINIC | Age: 24
End: 2024-05-31

## 2024-05-31 NOTE — TELEPHONE ENCOUNTER
[facility]  Social Work Note    Writer participated in conference call with Keren Bentley from [facility] and the patient to discuss insurance information.    Renewal MA will be processed today and sent to [facility] for determination Pt is likely over income for all state programs ([place] and [place]).  Once the [facility] officially determines this, patient will be eligible for SEP to enroll in a MNSure plan. He is eligible for $40 tax credit.    The plan the patient provided to the pharmacy earlier this month was a plan his family found online, it appears this is a vitamin/supplement card only that provides linkage to low cost health care if needed.  Because this is not a MNSure plan, patient remains eligible to choose a MNSure plan.  Keren will compile options for patient and will send to both writer and patient.  The earliest a MNSure plan would be active is July 1st, contingent on timely processing by [facility].    Pt expressed understanding of next steps and gave consent for ongoing communication between writer and [facility] on pt's behalf.    Rosa M Jaffe, [facility]  Clinical   [facility]  Office: [phone]

## 2024-05-31 NOTE — TELEPHONE ENCOUNTER
[place] [facility]  Social Work Note    Writer reached out to patient at 730 am to again inquire if he is available for an 8am insurance appointment.  Again stressed the urgency that this paperwork be completed today. Offered alternate option of 1pm.  Await response from patient.    Rosa M Jaffe, [facility]  Clinical   [place] [facility]  Office: [phone]    Addendum:  Conference call arranged with patient, writer, and [employer] Navigator at 1pm.

## 2024-05-31 NOTE — PROGRESS NOTES
LETTER        [facility]  [address] [place] [address], [address] [address]  Main: [phone], Fax: [phone]        [facility]      To Whom It May Concern,    I am writing to you on behalf of my patient, Sharron Kennedy. Sharron has severe hemophilia A. This is a chronic and serious health condition characterized by the inability of the blood to clot properly. If not properly managed, hemophilia can result in severe complications, including life-threatening hemorrhage.    Sharron requires regular treatment with emicizumab (Hemlibra) to prevent bleeding episodes, and factor VIII concentrates to control/treat bleeding episodes. These treatments are essential for managing the condition and preventing serious health risks such as internal bleeding, joint damage, and other potentially fatal complications. The continuous and consistent administration of these treatments is critical for maintaining Kodys health and well-being.    At present, Sharron does not have health insurance coverage, which poses a significant barrier to accessing the necessary medical treatments. The absence of insurance coverage places my patient at immediate risk of serious health consequences due to the inability to afford the required medications and care.    I strongly urge the [facility] to expedite the process of securing insurance coverage for Sharron. Ensuring that my patient receives timely and consistent medical care is vital for managing his hemophilia effectively and preventing potentially fatal outcomes.    Thank you for your prompt attention to this urgent matter.    Sincerely,      Vidhya Licona PA-C, [facility]  [address] [place] [address], [address] [place] [id number]  Main: [phone], Fax: [phone]

## 2024-06-13 ENCOUNTER — TELEPHONE (OUTPATIENT)
Dept: HEMATOLOGY | Facility: CLINIC | Age: 24
End: 2024-06-13

## 2024-07-03 NOTE — TELEPHONE ENCOUNTER
[place] [facility]    Received update from Keren at [facility] that [facility] is working on adding the patient to the current MNSure case for his family.    Next steps are for his family to submit their renewal paperwork.  Keren has offered to assist pt/family with documents and sent a detailed email to patient with instructions on follow-up.    Rosa M Jaffe, [facility]  Clinical   [place] [facility]  Office: [phone]      Addendum 7/3:  Checked in on status of pt's application. Keren stated [facility] is still waiting to receive Sharron's Family's renewal paperwork in order for them to move forward with the patient's coverage. Pt was give instructions and assistance was offered.  Await response from patient.

## 2024-07-22 ENCOUNTER — CARE COORDINATION (OUTPATIENT)
Dept: HEMATOLOGY | Facility: CLINIC | Age: 24
End: 2024-07-22

## 2024-07-22 NOTE — PROGRESS NOTES
"[facility]  COMPREHENSIVE CLINIC PRE-VISIT CARE COORDINATION NOTE     NAME: Sharron Kennedy  :  2000   AGE:  23 year old    Appointment date/time: 2024, 10am  *[place].  Writer reached out to the patient via text on  and by phone (voicemail left) and email  and again by text on .    Writer heard back from patient on   \"what is going on.\"  Writer responded that we can't find active insurance, and asked if he saw the emails from [facility]. He stated \"kulkarni I didn't notice. Yes please cancel.\"    Writer sent follow-up text on  to confirm it would be cancelled, and to inquire if he plans to pursue insurance as outlined in prior messages.    [facility]  and pt was aware next step was for his entire family to submit paperwork to [facility] for renewal, and then he would be added to their [place]are plan. Multiple outreach attempts have been made from  and [employer] to offer assistance.  Will discuss ongoing factor plan with team.    "

## 2024-08-05 ENCOUNTER — TELEPHONE (OUTPATIENT)
Dept: HEMATOLOGY | Facility: CLINIC | Age: 24
End: 2024-08-05

## 2024-08-05 NOTE — TELEPHONE ENCOUNTER
[facility]    Received update from our pharmacy team that per [place], [place] not have active coverage as of yet.    Writer reached out to patient to request a status update, acknowleding the challenges with navigating this and offered further assistance.    Will discuss with team.    Rosa M Jaffe, [facility]  Clinical   [facility]  Office: [phone]

## 2024-11-21 ENCOUNTER — TELEPHONE (OUTPATIENT)
Dept: HEMATOLOGY | Facility: CLINIC | Age: 24
End: 2024-11-21

## 2025-01-13 ENCOUNTER — TELEPHONE (OUTPATIENT)
Dept: HEMATOLOGY | Facility: CLINIC | Age: 25
End: 2025-01-13
Payer: COMMERCIAL

## 2025-02-10 ENCOUNTER — SKILLED CARE (OUTPATIENT)
Dept: HEMATOLOGY | Facility: CLINIC | Age: 25
End: 2025-02-10
Payer: COMMERCIAL

## 2025-02-11 ENCOUNTER — CARE COORDINATION (OUTPATIENT)
Dept: HEMATOLOGY | Facility: CLINIC | Age: 25
End: 2025-02-11
Payer: COMMERCIAL

## 2025-02-11 NOTE — PROGRESS NOTES
[place] [facility]  COMPREHENSIVE CLINIC PRE-VISIT CARE COORDINATION NOTE     NAME: Sahrron Kennedy  :  2000   AGE:  24 year old    Appointment date/time:       Assigned RN:  Cele Stack RN   Diagnosis:  Severe Hemophilia A   Factor level:  Baseline Factor VIII level of <1%       Inhibitor Hx:  No history of inhibitor, unable to find recent [place] Assay    Viral issues:  HIV and HCV status unknown, immunized against/immune to HAV and HBV      Other health issues:  Bell's Palsy, patellar tamia and hx of subluxation  Planned procedures or surgeries:  Unknown    Hemophilia treatment plan:  600 mg of Hemlibra subcutaneous injection every 28 days, Advate 50 units/kg for breakthrough bleeding   Method of logging infusions:  has [employer], intermittently logs      Psychosocial:       Last CC or Office Visit:     Confirmation of appointment:      Goals for Visit:         Medical Insurance:   Payer Type: Commercial  Payer Name: Other  HTC Specified Payer Name: MEDICA COMMERCIAL  Insurance Type: Primary  Coverage Type: Medical        Pharmacy Insurance:        Specialty Pharmacy:        Copay Assistance:      A/R Bill Balance:    HTC Assistance Fund:        PDC:        Pharmacokinetics:       Pharmacy Notes:             Joint concerns: H/o hemarthopathy all 6 index joints  & Bilateral patella tamia with h/o frequent Left patellar subluxation.   Orthopedic past medical history: None documented in chart, however, pt reports he thinks he fractured his Right medial knee in  or  in [place] & had surgical intervention, but plans to ask his mother for details.   Imaging: 10/19/2015 X rays of all 6 index joints: R elbow: slight narrowing of joint space, slight overgrowth of distal humerus and proximial radius & ulna. L elbow Xray: slight narrowing of joint space, slight overgrowth radial head. Left knee unremarkable. R knee slightly irregular femoral condyles & proximal tibial  epiphysis.  Narrowed tibiofemoral and patellofemoral joint spaces. R ankle calcaneal sclerotic focus likely is a bone island. L ankle possible slight narrowing of tibiotalar joint space. All 6 joints: No soft tissue abnormalities noted      PT orders:          Eligible studies:       Transition Tracker Eligible:         Genetics:        Available if interested  F8 intron 22 inversion [[facility], 2019]  Two maternal half-brothers with hemophilia

## 2025-02-11 NOTE — PROGRESS NOTES
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 Mild R ankle dorsiflexion loss, B patella tamia     Summary: Sharron denies pain, and reports joints, muscles, mobility and physical activity continue at a good level, or have improved since last visit. Updated home environment information, reviewed physical activity, confirms has access to PT rx. His balance has improved as measured by SLS and low fall risk. 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 His squat technique reveals decreased eccentric quad control, but declines teaching regarding this & PT interventions at this time.              Subjective         Presenting condition or subjective complaint:    Date of onset:      Relevant medical history:     Dates & types of surgery:      Prior diagnostic imaging/testing results:       Prior therapy history for the same diagnosis, illness or injury:        Prior Level of Function  Transfers: Independent  Ambulation: Independent  ADL: Independent  IADL: Driving, Finances, Housekeeping, Laundry, Meal preparation, Medication management, School, Work    Living Environment Sharron at times goes by his middle name \"Aj\"   Social support:   He lives in [place] with his parents and 6 younger siblings. 6 younger siblings (3 sisters, 3 brothers - two of which are affected with hemophilia as well). He has lived in [place], [place], arrived in  in 2010 and lived in [place], [place], and [place] before reloating to [place] in 2015.   Type of home:   house  Stairs to enter the home:       enters via front door, 2 stairs with B rails and can reach B rails. This enters into the basement with living room, bathroom with a walk-in shower, and laundry room.  Ramp:     Stairs inside the home:       1 flight (approx 14) with 1 rail on the " R as ascend.  Help at home:    Equipment owned:   cold pack, bilateral knee subluxation braces with most recent pair from [employer] in 2021 - currently using.     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  He reports minimal physical lifting associated with his tasks.  He graduated from [employer] in [employer] and is still looking for work in his field. 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 Drives self.   Hobbies/Interests:  raven  Physical Activity: His activity consists primarily of daily tasks and PCA tasks.  Spends much of his time sitting at his computer. He does UE resistance training such as biceps curls with 45# weight ~2x/wk and heel raises 3x/wk, wall sitting 2x/wk. Occasionally performs seated hamstring and gastroc stretch.     PT rx is on home screen of personal smart phone    OUTPATIENT PHYSICAL THERAPY HEMOPHILIA CLINIC NOTE    Name: Sharron Kennedy  : 2000  MRN: [id number]   Age: 24 year old   present:No, Language: English.  Performed: In Person.  Referring provider: Vidhya Licona PA-C   Reason for visit: Comprehensive Clinic.  Assigned RN:  Cele Stack RN          Diagnosis:  Severe Hemophilia A       Factor level:  Baseline Factor VIII level of <1%       Inhibitor Hx:  No history of inhibitor, unable to find recent [place] Assay    Viral issues:  HIV and HCV status unknown, immunized against/immune to HAV and HBV      Other health issues:  Bell's Palsy, patellar tamia and hx of subluxation  Planned procedures or surgeries:  Unknown    Hemophilia treatment plan:  600 mg of Hemlibra subcutaneous injection every 28 days, Advate 50 units/kg for breakthrough bleeding   Method of logging infusions:  has Yodlee, intermittently logs     Interval History (include personal factors and/or comorbidities that  impact the plan of care): Has been taking Hemlibra every other month instead of the prescribed monthly dose.  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 Mild R ankle dorsiflexion loss  & Bilateral patella tamia with h/o frequent Left patellar subluxation.   Orthopedic past medical history: None documented in chart, however, pt reports he thinks he fractured his Right medial knee in 2006 or 2007 in [place] & had surgical intervention, but plans to ask his mother for details.   Imaging: 10/19/2015 X rays of all 6 index joints: R elbow: slight narrowing of joint space, slight overgrowth of distal humerus and proximial radius & ulna. L elbow Xray: slight narrowing of joint space, slight overgrowth radial head. Left knee unremarkable. R knee slightly irregular femoral condyles & proximal tibial epiphysis.  Narrowed tibiofemoral and patellofemoral joint spaces. R ankle calcaneal sclerotic focus likely is a bone island. L ankle possible slight narrowing of tibiotalar joint space. All 6 joints: No soft tissue abnormalities noted    Bleeds: See referring provider's note. No spontaneous bleeds, joint swelling or pain in the past year.  Abuse screen: addressed by social work.    Patient/family rehab goal: Getting a 2nd job, no rehab goals identified.    Reported Pain/Stiffness: Denies pain in the past year    Fall Risk Screen:  Has the patient fallen 2 or more times in the last year? No  Has the patient fallen and had an injury in the past year? No  Is the patient a fall risk? No     Bleeding history-lifetime (patient recall, ATHN dataset categories) Unchanged from 7/18/23, though updated the R knee bleed value based [facility]  L = Left  R = Right 0 1-3 4-19 20+ Unable to Recall Comments   Shoulder R L - 1? Says was an injury           Elbow       L,R       Hip L,R             Knee L,R R            Ankle   L,R       Once each side     Physical Exam:    ROM (negative = hyperext, 0 = neutral, positive = flexion. Ankle DF: negative = unable to attain neutral)  24 year old  Data from 7/18/23  Age                                             22 Current       Current     Current       Current     Current       Current         L Elbow Norm- Prior- Contra- R Elbow Norm- Prior- L Knee Norm- Prior- Contra- R Knee Norm- Prior- L Ankle Norm- Prior- Contra- R Ankle Norm- Prior-   Flex (PF) 152 18 1 25 127 -7 -7 148 23 -3 15 133 8 11 57 18 7 4 53 14 3   Ext (DF) 33 26 -8 5 38 31 3 0 -4 0 0 0 -4 0 2 1 1 4 -2 -3 0   Pronation decreased       mildly decreased                                 Supination WFL       WFL                                     Hemophilia Joint Health Score 2.1 -Summary Score Sheet   2006, International Prophylaxis Study Group    Data from 7/18/23  [facility] Total Score 21 out of max of  124                              Joint Score                 17 L Elbow R Elbow L Knee R Knee L Ankle R Ankle     Swelling 0 0 0 0 0 0     Duration Swelling 6mo 0 0 0 0 0 0     Muscle Atrophy 0 0 0 1 0 0     Crepitus on Motion 0 0 2 2 0 0     Flexion Loss 0 3 0 2 0 0     Extension Loss 3 3 0 0 0 1     Joint Pain 0 0 0 0 0 0     Strength 0 0 0 0 0 0     Individual Joint Total 3 6 2 5 0 1       Function Left Right Comments   Hand to mouth WFL WFL     Hand behind head WFL WFL     Hand behind low back WFL WFL     Figure 4 WFL WFL     Squat WFL WFL Only to 90 degrees, rises up on toes, moves quickly to overcome decreased control.   Step-over WFL WFL Mild circumduction     Functional mobility: Independent transfers without AD. Reports independent  floor transfer.  Gait: -Independent without assistive device.  B knee flexion throughout gait, increased ANDERS, increased lateral weight shifting, RLE internal rotation & overpronation.  Equal step length, equal time in stance, good tio, stable with no balance concerns.  Adjusts speed and negotiates obstacles with mild difficulty  Stairs:  Reports continued Independent. Ascends with 1-2 stairs at a time with reciprocal pattern, no rail, good speed.  Descends with reciprocal pattern, no rail, but slower, inconsistent speed and decreased eccentric quad control.   Posture/Alignment: Forward head, mild bilateral forward shoulder and bilateral shoulder internal rotation, R forefoot adduction and mild RLE valgus   Muscle Length:  Data from 7/18/23  Hamstring length (+ if > 20  short of full knee extension):  L + & 56 degrees short of full knee ext, R+ & 63 degrees short of full knee ext  Gastrocnemius length (+= does not attain bench logan: contralateral knee touch wall 5 cm ahead of front toes while keeping B heels on floor, knee not crossing midline, neutral LE & pelvic rotation, no overpronation): L +, R+ by ~10 cm  Soleus length (+= does not attain bench logan: contralateral knee touch wall 5 cm ahead of front toes while keeping B heels on floor, knee not crossing midline, neutral LE & pelvic rotation, no overpronation):  L +, R+ by ~10 cm  Single leg stance: Left (sec) 60+ Right (sec) 60+ with eyes open 7./18/23 values:  Left (sec) 30+, Right (sec) <1 with eyes open.   Patients ages 53-68 associated with greater falls with cut-off time of <15 seconds, >30 seconds associated with less risk of falling (Sharath et al., 2022).  Fall risk in Parkinson's Disease patients with cut-off time of <10 seconds for patients with a history of falls and >10 seconds for non-fallers (Hester, 2006).  Patient's age: 24 year old Normative data from Hobbs B, Javier R, Thomas T, et at. 2007 and Nuvia et. Al. 2016. Eyes open:  Age                 Time: Men (seconds)       Time: Women (seconds)  20-29                           66.4                                       72.7      30-39                           66.6                                       67.5  40-49                           60.2                                       58.9  50-59                           50.3                                        50.7  60-64                           33.8                                       39.5  65-69                           33.8                                       30.4  70-79                           25.9                                       16.7       80-99                            8.7                                        10.6    Eyes closed:  Age        Time (seconds) (std dev):  20-49  20.7  (9.5)  50-59   6.1    (4.7)  60-69  2  (4.7)  70-79  1 (3.9)    Equipment issued: None. Denies need.    Contact Time: 13 min     Signing Clinician: Jailene Santiago, PT

## 2025-02-17 NOTE — PROGRESS NOTES
[facility]  [facility]ot[facility]  [facility] [address], [address], [place] [address]  Main: [phone], Fax: [phone]    Comprehensive Hemophilia Clinic Visit        Patient: Sharron Kennedy  MRN: [id number]  : 2000  VAMSHI: 2025    Sharron Kennedy is a 24 year old male with severe hemophilia A that presents today for a routine comprehensive hemophilia clinic visit.    HISTORY   Hemophilia History:   Severe hemophilia A.    Sharron has no personal history of factor VIII inhibitor but does have a family history of factor VIII inhibitor in a maternal half brother. Sharron has well over 50 lifetime exposure to factor VIII concentrates.   Sharron was diagnosed with hemophilia at age 1. At that time, he was living in [facility]. As a child, bleeding events were treated with FFP and possibly factor VIII concentrates.   [place] in . He arrived to [place], then moved to [place] in 2010.    Started treating on-demand with Advate after his arrival to [place].   He started prophylaxis in  with thrice weekly Advate. He was living in [place] at that time.   He had a port-a-cath in place [id number]. It was removed due to infection, then was not replaced.   He moved in [place] in  and as established care with the [facility].   He underwent circumcision in . Treated with rFVIII surrounding this procedure.   In , His Advate dose frequency was changed to every other day due to worsening joint pain/concern for recurrent bleeds.   He started Hemlibra for prophylaxis in .   He transitioned to our [facility] in .   No history of ICH.    Joint History/Pain Management:  In the past, he has been reported to have evidence of hemarthropathy of all 6 index joints (bilateral elbows, bilateral knees, bilateral ankles), though most recent joint  "measurements (dated 2023) and patient reported symptoms would suggest the following:   There is evidence of mild hemarthropathy (decreased ROM, crepitus) in the bilateral elbows.   There is evidence of mild hemarthropathy (decreased ROM, crepitus) in the right knee.    There is a smaller amount of of crepitus/ROM loss in the left knee.   There is a smaller amount of crepitus in the right ankle.   The left ankle is preserved.    He denies chronic pain in any of his joints.   Has never had any joint procedures.   Does not take any routine pain medications.     Infectious Disease History:   HIV negative in 2022.   Hepatitis C negative in 2022.    Other Pertinent Medical History:   History of Bell's Palsy in 2022. Treated with valacyclovir. Has had no recurrence.    Family History:   Please refer to previous notes by Osiris Mosquera, PhD, MS, [facility] for additional details regarding his family history.   Sharron's mother is a carrier.   No full siblings.   He has 6 maternal half siblings (3 sisters and 3 brothers). 2 of his maternal half brothers have hemophilia. One of the half brothers with hemophilia also has Autism. One of the half brothers has a history of factor VIII inhibitor.   Sharron reports that his mother is 6' tall and his father is 6'4\". He reports that he is the tallest of his siblings but again, they do not share the same father.    Social History:   Please refer to Rosa M VILLAR's note for detailed social history.    CURRENT TREATMENT REGIMEN  Prophylaxis:   Hemlibra 6mg/kg q28 days.    Breakthrough/On-Demand:   Advate 5000 units (~55u/kg) PRN.    INTERVAL HISTORY  Sharron's last comprehensive clinic visit was on 7/18/23. In the interim, he has continued on Hemlibra 6mg/kg q28 days for prophylaxis, though in more recent months, due to insurance concerns/desire for medication conservation, he has been taking emicizumab every other month. Fortunately, he has had no breakthrough bleeding events. " Nor has he had any procedures/surgeries, thus he has not had any doses of factor since we last saw him. He reports no joint pain at all. In fact, he reports that he is overall well and has no complaints today. Of note, he has not seen a primary care provider or dentist in some time.    OBJECTIVE  Exam:  /71 (BP Location: Right arm)   Pulse 89   Temp 97.1  F (36.2  C) (Tympanic)   Wt 93.8 kg (206 lb 11.2 oz)   SpO2 97%   BMI 24.51 kg/m    Constitutional: Healthy, alert, no distress.  Head: Normocephalic. No masses, lesions, tenderness or abnormalities  Chest/Cardiovascular: RRR. No murmurs, clicks gallops or rub. No pectus deformities.  Respiratory: Lungs clear.  Musculoskeletal: See Jailene Santiago DPT's note.  Neurologic: A&Ox3.     ASSESSMENT  In summary, Sharron Kennedy is a 24 year old male that has severe hemophilia A. He has no personal history of factor VIII inhibitor but there is a family history of inhibitor in one of his maternal half brothers. Sharron has well over 50 lifetime exposure to factor VIII concentrates. He is originally from [place], and was not on prophylaxis [place]. He does have evidence of hemarthropathy, primarily affecting the bilateral elbows, but fortunately, this does not cause him pain and restricts him only minimally. He was on factor prophylaxis ~[id number]. He has been on emicizumab for prophylaxis since 2019 and has done excellent on this regimen. He has had no bleeding events in the past 18 moths. We will continue him on emicizumab for prophylaxis, with a slight adjustment to his dose to accommodate weight change. Discussed importance of consistent q28day dosing to ensure that he is adequately protected. Breakthrough bleeding events will be treated with Advate. He is skilled at self-infusion, and keeps a dose of Advate on hand at home.    PLAN  Hemophilia Treatment Plan:   Hemlibra 6mg/kg--Rx updated to 570mg q28 days.   Advate  5159 units (~55u/kg) PRN for breakthrough bleeding.     Hemophilic Arthropathy Management Plan:   See Jailene Santiago DPT's note.    Routine Health Maintenance:   Follow-up q6-12 months with primary care provider and dentistry, or per their recommendations.     [facility] Follow-Up:   Follow-up in 1 year for a comprehensive hemophilia clinic visit. Sooner if/as needed.       The following individuals have seen this patient independently today:   Cele Stack RN.   Rosa M Jaffe [facility].   [facility] pharmacy liaison.   Jailene Santiago DPT.     Dr. Pino Alvarez, staff hematologist has also seen this patient today in clinic.       Vidhya Licona PA-C, [facility]  [facility]  [address], [address], [place] [address]  Main: [phone], Fax: [phone]

## 2025-02-18 ENCOUNTER — THERAPY VISIT (OUTPATIENT)
Dept: PHYSICAL THERAPY | Facility: CLINIC | Age: 25
End: 2025-02-18
Attending: PHYSICIAN ASSISTANT
Payer: COMMERCIAL

## 2025-02-18 ENCOUNTER — OFFICE VISIT (OUTPATIENT)
Dept: HEMATOLOGY | Facility: CLINIC | Age: 25
End: 2025-02-18
Attending: INTERNAL MEDICINE
Payer: COMMERCIAL

## 2025-02-18 ENCOUNTER — ALLIED HEALTH/NURSE VISIT (OUTPATIENT)
Dept: HEMATOLOGY | Facility: CLINIC | Age: 25
End: 2025-02-18

## 2025-02-18 VITALS
HEART RATE: 89 BPM | SYSTOLIC BLOOD PRESSURE: 120 MMHG | WEIGHT: 206.7 LBS | OXYGEN SATURATION: 97 % | DIASTOLIC BLOOD PRESSURE: 71 MMHG | TEMPERATURE: 97.1 F | BODY MASS INDEX: 24.51 KG/M2

## 2025-02-18 DIAGNOSIS — D50.9 IRON DEFICIENCY ANEMIA, UNSPECIFIED IRON DEFICIENCY ANEMIA TYPE: Primary | ICD-10-CM

## 2025-02-18 DIAGNOSIS — D66 HEMOPHILIA (H): ICD-10-CM

## 2025-02-18 DIAGNOSIS — Z71.9 ENCOUNTER FOR COUNSELING: Primary | ICD-10-CM

## 2025-02-18 DIAGNOSIS — D66 HEMOPHILIC ARTHROPATHY (H): ICD-10-CM

## 2025-02-18 DIAGNOSIS — Q68.2 PATELLA ALTA: ICD-10-CM

## 2025-02-18 DIAGNOSIS — M36.2 HEMOPHILIC ARTHROPATHY (H): ICD-10-CM

## 2025-02-18 LAB
ALBUMIN SERPL BCG-MCNC: 4.5 G/DL (ref 3.5–5.2)
ALP SERPL-CCNC: 83 U/L (ref 40–150)
ALT SERPL W P-5'-P-CCNC: 17 U/L (ref 0–70)
ANION GAP SERPL CALCULATED.3IONS-SCNC: 10 MMOL/L (ref 7–15)
AST SERPL W P-5'-P-CCNC: 20 U/L (ref 0–45)
BILIRUB SERPL-MCNC: 0.2 MG/DL
BUN SERPL-MCNC: 15.6 MG/DL (ref 6–20)
CALCIUM SERPL-MCNC: 9.3 MG/DL (ref 8.8–10.4)
CHLORIDE SERPL-SCNC: 100 MMOL/L (ref 98–107)
CREAT SERPL-MCNC: 0.67 MG/DL (ref 0.67–1.17)
EGFRCR SERPLBLD CKD-EPI 2021: >90 ML/MIN/1.73M2
ERYTHROCYTE [DISTWIDTH] IN BLOOD BY AUTOMATED COUNT: 14.1 % (ref 10–15)
FERRITIN SERPL-MCNC: 117 NG/ML (ref 31–409)
GLUCOSE SERPL-MCNC: 111 MG/DL (ref 70–99)
HCO3 SERPL-SCNC: 28 MMOL/L (ref 22–29)
HCT VFR BLD AUTO: 36.3 % (ref 40–53)
HGB BLD-MCNC: 11.7 G/DL (ref 13.3–17.7)
IRON BINDING CAPACITY (ROCHE): 250 UG/DL (ref 240–430)
IRON SATN MFR SERPL: 23 % (ref 15–46)
IRON SERPL-MCNC: 58 UG/DL (ref 61–157)
MCH RBC QN AUTO: 25.1 PG (ref 26.5–33)
MCHC RBC AUTO-ENTMCNC: 32.2 G/DL (ref 31.5–36.5)
MCV RBC AUTO: 78 FL (ref 78–100)
PLATELET # BLD AUTO: 192 10E3/UL (ref 150–450)
POTASSIUM SERPL-SCNC: 3.8 MMOL/L (ref 3.4–5.3)
PROT SERPL-MCNC: 7.8 G/DL (ref 6.4–8.3)
RBC # BLD AUTO: 4.67 10E6/UL (ref 4.4–5.9)
SODIUM SERPL-SCNC: 138 MMOL/L (ref 135–145)
WBC # BLD AUTO: 4 10E3/UL (ref 4–11)

## 2025-02-18 PROCEDURE — 80053 COMPREHEN METABOLIC PANEL: CPT | Performed by: INTERNAL MEDICINE

## 2025-02-18 PROCEDURE — 83540 ASSAY OF IRON: CPT | Performed by: INTERNAL MEDICINE

## 2025-02-18 PROCEDURE — 82728 ASSAY OF FERRITIN: CPT | Performed by: INTERNAL MEDICINE

## 2025-02-18 PROCEDURE — 999N000104 HC STATISTIC NO CHARGE: Performed by: REHABILITATION PRACTITIONER

## 2025-02-18 PROCEDURE — 36415 COLL VENOUS BLD VENIPUNCTURE: CPT | Performed by: INTERNAL MEDICINE

## 2025-02-18 PROCEDURE — 85014 HEMATOCRIT: CPT | Performed by: INTERNAL MEDICINE

## 2025-02-18 PROCEDURE — 85130 CHROMOGENIC SUBSTRATE ASSAY: CPT | Performed by: INTERNAL MEDICINE

## 2025-02-18 RX ORDER — EMICIZUMAB 150 MG/ML
570 INJECTION, SOLUTION SUBCUTANEOUS
Qty: 1.4 ML | Refills: 11 | Status: SHIPPED | OUTPATIENT
Start: 2025-02-18

## 2025-02-18 RX ORDER — ANTIHEMOPHILIC FACTOR (RECOMBINANT) 3000 (+/-)
5159 KIT INTRAVENOUS
Qty: 5159 EACH | Refills: 0 | Status: SHIPPED | OUTPATIENT
Start: 2025-02-18

## 2025-02-18 RX ORDER — ANTIHEMOPHILIC FACTOR (RECOMBINANT) 3000 (+/-)
5000 KIT INTRAVENOUS
Qty: 5000 EACH | Refills: 0 | Status: SHIPPED | OUTPATIENT
Start: 2025-02-18 | End: 2025-02-18

## 2025-02-18 RX ORDER — EMICIZUMAB 300 MG/2ML
570 INJECTION, SOLUTION SUBCUTANEOUS
Qty: 3.8 ML | Refills: 11 | Status: SHIPPED | OUTPATIENT
Start: 2025-02-18

## 2025-02-18 RX ORDER — EMICIZUMAB 150 MG/ML
570 INJECTION, SOLUTION SUBCUTANEOUS
Qty: 0.4 ML | Refills: 11 | Status: SHIPPED | OUTPATIENT
Start: 2025-02-18

## 2025-02-18 NOTE — PATIENT INSTRUCTIONS
Emergency Treatment Recommendations    Date: 2025    Name: Sharron Kennedy  MRN: [id number]     : 2000  Diagnosis:  severe Hemophilia A  Baseline factor VIII level: <1 %  Inhibitor status: No history of inhibitor  Patient weight: 94kg  Current treatment:  Hemlibra 570mg every 28days, Advate 5000 units as needed for breakthrough bleed  Half-life information:  unknown    For bleeding events:   Treat with: recombinant factor VIII 50 units/kg   Factor replacement should be administered emergently and prior to imaging or any invasive/surgical procedure   For suspected intracranial hemorrhage or life / limb threatening bleed, draw a peak factor VIII level 5-10 minutes after administration   For mucosal bleeding, antifibrinolytics may be useful; these are contraindicated in hematuria  Aminocaproic acid (Amicar) 50mg/kg IV or PO q 6 hours  Tranexamic acid (Lysteda) 1300 mg po TID or 10mg/kg IV q 8 hours     A single dose of factor replacement will temporarily correct the bleeding disorder-- further dosing may be required.  For guidance, please contact [facility]: [phone]  After Hours please call [phone] and ask for hematologist on call      Factor Plan:   Dose of factor Hemlibra 570mg every 28 days  if on prophy treatment with factor, call with any breakthrough bleeding for possible dose adjustment.  If only treating for bleeds, please call if more than 2 bleeds in one joint in 3 month period.  For emergency head trauma, or signs of serious bleeing,  please treat with at least 50 units/kg to boost your factor level to 100%.  Please seek emergency care for head CT scan.  Continue to keep infusion records via [employer]        General Recommendations:  See primary care provider for general health maintenance. You can call #[phone] to schedule with a primary care provider. Let them know you don't currently have one and are looking to establish  "care.  See dentist every 6 months. You can schedule with the [facility] by calling #[phone]. Let them know you are scheduling a routine cleaning. Call the center for recommendations for dental visits that involve more than an examination and cleaning. If you have had a joint replacement or port for infusions, please call for antibiotic recommendations for dental cleaning.  Wear medic alert on your person for highest level of safety www.medicalPicodeon.org  Carry factor concentrate with you at all times. Call the center if you will be traveling out of the area. We can create a travel letter prior to your departure . For treatment centers around the world go to www.[email or web address], click on \"Support\" and then scroll down to  \"Find Local Support\" and click on Hemophilia  Treatment Centres\" link.   Enter country +/- city,  then scroll up or down to find city closest to destination.  For any questions, your nurse clinician is Cele Stack RN, BSN, [phone] .  If Cele is unavailable and you have immediate concerns, please call [phone] and ask for a nurse.   If you have emergency needs in the evening or weekend, please call [phone] and ask for the hematologist on call. Please return for comprehensive clinic in 1 year.    Patient Education & Resources  If you would like further information about your bleeding disorder, the following links may be of help:  The National Bleeding Disorders Foundation (includes HANDI - Information Resource Center under \"Community Resources\")   bleeding.org  The World Federation of Hemophilia (includes Global Treatment [place] Directory)   wfh.org  The Hemophilia Foundation of [place]/\A Chronology of [place] [facility]\"" (organizes local annual meeting in Spring & other events)  hfmd.org    Additional Patient Information  Here is the information reviewed you today regarding HRSA patient choice information:    Per the 2005 [employer] Manual for Participating in the Drug Pricing Program Established by Section 340B of " "the Public Health Service Act I. Freedom of Choice Regarding Factor Replacement Products and Avoidance of Conflict of Interest It is a requirement of the [facility] National Hemophilia Program (NHP) that all [facility] funded hemophilia treatment centers (HTCs) have a \"Freedom of Choice\" policy where patients are informed of choices they have regarding factor replacement products (FRP) and where these products might be purchased. It is important that this policy be exercised with all patients and it is especially important that this policy be exercised by [facility] funded HTCs that sell FRP since income generated from this activity is used to further the provision of services by the [employer]. To avoid any appearance of conflict of interest, patients must be informed about their choices and be encouraged to make whatever decision they desire.    "

## 2025-02-18 NOTE — PROGRESS NOTES
[facility]  Social Work Evaluation    Name:  Sharron Kennedy  Age:   24 year old  :  2000    Presenting Information:  Sharron is followed by the [facility] for management of severe hemophilia A.  He presented to clinic today for a routine comprehensive clinic visit.  Met with the patient 1:1 to complete an updated psychosocial assessment.  Pt was last seen in clinic in 2023 due to an insurance lapse in early .    Living Situation:   Sharron lives with his mom a 6 younger siblings in [place], [place]  Housing is stable.    Sharron was born in [facility]. His family arrived in [place] in  and after a few weeks, relocated to [place]. They lived there for 9 months before moving to [place], where the lived for 3 years. In the summer of , when Sharron was 14, they moved to [place] where he started his freshman year of high school. They initially lived in [place] and moved to [place] .     Family/Social Support:  Family includes his parents (parents are still together, but dad lives in [place] for work) and 6 younger siblings (3 sisters, 3 brothers - two are affected with hemophilia). He stated he has a small friend group that he primarily keeps in touch with electronically right now as he is so busy. He is not in a romantic relationship at this time He described a stable and involved support network.     Functional Status: Independent with ADLs and IADLs    Current Community Services:    Re-establishing with [facility] and  copay assistance for ongoing medication needs.  He experienced an insurance lapse in .    Past Community Services:   Not discussed today.    Chemical Use:  Sharron denied use of alcohol, tobacco or ilicit substances.     Mental/Emotional Health:   PHQ-2: 0  CoreHEM: Positive [place]    Pain Management Strategies:   Does not report chronic pain.    Abuse Concerns:  No  concerns were reported.    Education: Graduated from [employer] in 2019 and [employer] with an AA in.  He studied He studied computer science.    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  He is hoping to find a job where he can utilize his degree in computer science.    School/Work Attendance:   Sharron has missed zero days of work in the past year due to his bleeding disorder.    Financial/Income:  Payroll. He denied financial concerns or stressors.    Health Insurance:  Marketplace plan through [employer].  Pt experienced an insurance lapse in early 2024 when renewals were reinstated when the public health emergency ended.  He was determined to be over income for state programs, and missed his SEP.  As a result, he was ineligible to apply for private insurance until 2025.  He successfully navigated this and now has marian coverage.    Health Literacy/Adjustment to Illness:  Sharron experienced a lapse of insurance last year when the public health emergency ended.  He was determined to be over-income for MA and missed his SEP.  He navigated obtaining a private plan and now has health coverage so will be getting back on track with his treatment plan.    Medical Alert ID Tag:  Bracelet, wears on occasion.  Discussed health yesenia..    Advanced Care Planning:  None, Declined.    Authorization to discuss PHI:  On file, listing mom.  Electronic Consent for email: On file.  Electronic Consent for text messaging:  On file.    Interventions Utilized:  Assessment of strengths and needs  Assessment of impact of living with a chronic health condition, overall adjustment, and current coping skills  Explored aspects of family history and dynamics   Explored and processed emotional/social  responses to bleeding disorder and treatment plan.  Normalized and validated feelings and experiences  Provided positive feedback and encouragement  Encouraged ongoing self-care and continued attention to self-care  Case Coordination with [facility] Team    Impressions/Recommendations:    Sharron Kennedy is a 24 year old male who [facility] this date for a routine comprehensive clinic visit.  He presented today as alert, oriented and engaged and they were welcoming of social work visit.    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  Since his last comp he graduated with an AA in [employer] science and he is hoping to obtain a job in his career field.  He experienced an insurance lapse and stated he learned a lot throughout the process.  He successfully obtained a private [employer] plan and has covered for 2025.    Sharron has good coping skills and a stable and involved support network.     Plan:   There were no ongoing psychosocial needs identified.  Writer remains available to assist as needs arise, and Sharron was encouraged to contact writer as needed.    Rosa M Jaffe, [facility]  Clinical   [facility]  Office: [phone]

## 2025-02-18 NOTE — PROGRESS NOTES
Sharron Kennedy  [id number]  2000    Teaching Flowsheet   Sharron Kennedy  has a diagnosis of hemophilia A with a baseline factor VIII level of  <1% . Sharron Kennedy  presents today for his comprehensive Hemophilia clinic evaluation.    Teaching Topic: annual visit     Learning Assessment  Person(s) involved in teaching:   Patient     Motivation Level:  Asks Questions: Yes  Eager to Learn: Yes  Cooperative: Yes  Receptive (willing/able to accept information): Yes       Patient demonstrates understanding of the following:  Reason for the appointment, diagnosis and treatment plan: Yes  Knowledge of proper use of medications and conditions for which they are ordered (with special attention to potential side effects or drug interactions): Yes  Which situations necessitate calling provider and whom to contact: Yes    Pain management techniques: Yes  How and/when to access community resources: Yes     Nursing Summary  Sharron Kennedy uses Hemlibra and treats prophylactically every 28 days.  He treats himself using independent subcutaneous injections and his typical dose is 570mg. He keeps is encouraged to keep track of his infusions using the [employer] yesenia.    Patient's goal for today's visit is: annual visit    Other Health Maintenance  Patient does not have a primary care provider and was encouraged to consider one  Patient does not have a dentist- was encouraged to get an appointment    Nursing Education Provided:  - Reviewed procedure for home infusions of factor concentrates.  See attached treatment recommendations.   - Reviewed severe/life threatening hemorrhage and handout given that recommends appropriate dosing.  If suspect bleeding in head, neck, throat or abdomen, give dose of factor if able, contact the hemophilia center immediately or report to the Emergency Room at [facility] or the nearest emergency room.   - Discussed need for dental check-ups and prophylactic  antibiotics for dental procedures.   - Reviewed resources/weblinks with patient.  The National Hemophilia Foundation (HANDI educational resource request)   Www.hemophilia.org  The World Federation of Hemophilia (Find a treatment center)   Www.[facility].org  -Patient was given [facility] Contact Card.

## 2025-02-19 ENCOUNTER — TELEPHONE (OUTPATIENT)
Dept: HEMATOLOGY | Facility: CLINIC | Age: 25
End: 2025-02-19
Payer: COMMERCIAL

## 2025-02-19 ENCOUNTER — DOCUMENTATION ONLY (OUTPATIENT)
Dept: HEMATOLOGY | Facility: CLINIC | Age: 25
End: 2025-02-19
Payer: COMMERCIAL

## 2025-02-19 LAB — FACT VIII ACT/NOR PPP CHRO: <1 % (ref 43–159)

## 2025-02-19 NOTE — PROGRESS NOTES
I met with Sharron HELLER Betsyal on 2025 during his annual comprehensive clinic visit with the [facility].    We discussed his current treatment of Hemlibra 600 mg subcutaneously every 28 days and Advate 5000 units (+/-10%) iv every 24 hours. Sharron had a lapse of insurance from 2024 to 2024. His income was too high to be re-enrolled into the state insurance program. He attempted to apply for other insurances until open enrollment was open at the end of  in the market place for coverage in . Unfortunately, those insurance plans had bare minimum coverage. He was able to obtain free Hemlibra from the [employer] Patient Assistance program from 2024 to 2024 and had been treating every other month. His last treatment was on December, so our team opted not to have him reload his doses.     His weight loss, did decrease his Hemlibra dose to 570 mg every 28 days. His Advate  dose increased a tad to 5159 units (+/-10%)(55 units/kg) iv every 24 hours as needed for bleeding. The Advate he had on hand . We will be sending him Hemlibra and a dose of Advate via  on . I also helped him enroll in both Helmibra and Advate copay assistance.     Does Pt currently log? Yes   Type of logs: [facility]  Outcome of logging? On-going    We discussed the HRSA Statement and the patient fully understood.    I spent 9 minutes face to face in clinic today with TOAN Murphy., [facility]  Lead Pharmacy Coordinator  [facility]  [phone]

## 2025-02-19 NOTE — PROGRESS NOTES
Medication/Dose: Advate 5159 units (+/-10%) iv every 24 hours as needed for bleeding      If notification received from pharmacy:   Pharmacy name: [facility]   Pharmacy Phone: [phone]   Insurance name: [employer])     PA approved.   Effective dates: 2/19/2025 to 2/18/2026   Ref #: [id number]          SANTO Murdock, [facility]  Lead Pharmacy Coordinator  [facility]  [phone]

## 2025-02-19 NOTE — TELEPHONE ENCOUNTER
DATE/TIME OF CALL RECEIVED FROM LAB:  02/19/25 at 12:25 PM   LAB TEST & VALUE:  Chromogenic Factor VIII level of less than 1%  Will route to provider, Vidhya Licona PA-C. Expected result. Patient has Severe Hemophilia A  Enma Winchester RN, BSN, PCCN  Nurse Clinician    [facility]  [address] [place] [address], [facility] [address] [address]   Office, direct: [phone]  Main office number: [phone]  Pronouns: She, her, hers

## 2025-02-20 ENCOUNTER — DOCUMENTATION ONLY (OUTPATIENT)
Dept: HEMATOLOGY | Facility: CLINIC | Age: 25
End: 2025-02-20
Payer: COMMERCIAL

## 2025-02-20 DIAGNOSIS — D50.9 IRON DEFICIENCY ANEMIA, UNSPECIFIED IRON DEFICIENCY ANEMIA TYPE: Primary | ICD-10-CM

## 2025-02-20 RX ORDER — FERROUS SULFATE 325(65) MG
325 TABLET ORAL
Qty: 30 TABLET | Refills: 2 | Status: SHIPPED | OUTPATIENT
Start: 2025-02-20 | End: 2025-02-20

## 2025-02-20 RX ORDER — FERROUS SULFATE 325(65) MG
325 TABLET ORAL
Qty: 100 TABLET | Refills: 1 | Status: SHIPPED | OUTPATIENT
Start: 2025-02-20

## 2025-02-20 NOTE — PROGRESS NOTES
Left voicemail for Sharron about his lab results and low iron/new Fe Rx. Call back number provided for pt to call back and discuss results/instructions.    Rx sent to [facility] pharmacy with detailed instructions.    Will plan to recheck labs in 3 months.    Cele CODYN, RN, PHN   [facility]   Office: [phone]  Fax: [phone]

## 2025-04-14 ENCOUNTER — TELEPHONE (OUTPATIENT)
Dept: HEMATOLOGY | Facility: CLINIC | Age: 25
End: 2025-04-14
Payer: COMMERCIAL

## 2025-04-14 NOTE — TELEPHONE ENCOUNTER
[facility]  Brief Social Work Note    Received an update from our pharmacy team that the patient voiced concern over affording his comp clinic bill.    Spoke with patient to review potential resources and writer submitted a request to [facility] for support on his behalf.    Rosa M Jaffe, [facility]  Clinical   [facility]  Office: [phone]    Addendum:  [facility] approved the request for assistance and bill was paid.  Spoke with pt and he is appreciative of the support.

## 2025-08-09 ENCOUNTER — TELEPHONE (OUTPATIENT)
Dept: HEMATOLOGY | Facility: CLINIC | Age: 25
End: 2025-08-09
Payer: COMMERCIAL